# Patient Record
Sex: FEMALE | Race: WHITE | NOT HISPANIC OR LATINO | Employment: OTHER | URBAN - METROPOLITAN AREA
[De-identification: names, ages, dates, MRNs, and addresses within clinical notes are randomized per-mention and may not be internally consistent; named-entity substitution may affect disease eponyms.]

---

## 2024-10-03 ENCOUNTER — OFFICE VISIT (OUTPATIENT)
Dept: PRIMARY CARE CLINIC | Facility: CLINIC | Age: 69
End: 2024-10-03
Payer: MEDICARE

## 2024-10-03 VITALS
OXYGEN SATURATION: 96 % | DIASTOLIC BLOOD PRESSURE: 84 MMHG | HEART RATE: 82 BPM | SYSTOLIC BLOOD PRESSURE: 136 MMHG | TEMPERATURE: 98 F

## 2024-10-03 DIAGNOSIS — M79.89 LEG SWELLING: ICD-10-CM

## 2024-10-03 DIAGNOSIS — Z11.59 ENCOUNTER FOR HEPATITIS C SCREENING TEST FOR LOW RISK PATIENT: ICD-10-CM

## 2024-10-03 DIAGNOSIS — Z00.00 ENCOUNTER FOR MEDICAL EXAMINATION TO ESTABLISH CARE: Primary | ICD-10-CM

## 2024-10-03 DIAGNOSIS — F41.9 ANXIETY: ICD-10-CM

## 2024-10-03 DIAGNOSIS — F33.1 MAJOR DEPRESSIVE DISORDER, RECURRENT, MODERATE: ICD-10-CM

## 2024-10-03 DIAGNOSIS — R71.8 LOW MEAN CORPUSCULAR VOLUME (MCV): ICD-10-CM

## 2024-10-03 DIAGNOSIS — E03.9 ACQUIRED HYPOTHYROIDISM: ICD-10-CM

## 2024-10-03 DIAGNOSIS — Z23 NEED FOR INFLUENZA VACCINATION: ICD-10-CM

## 2024-10-03 DIAGNOSIS — I10 HYPERTENSION, UNSPECIFIED TYPE: ICD-10-CM

## 2024-10-03 DIAGNOSIS — E78.5 HYPERLIPIDEMIA, UNSPECIFIED HYPERLIPIDEMIA TYPE: ICD-10-CM

## 2024-10-03 DIAGNOSIS — K21.9 GASTROESOPHAGEAL REFLUX DISEASE, UNSPECIFIED WHETHER ESOPHAGITIS PRESENT: Chronic | ICD-10-CM

## 2024-10-03 DIAGNOSIS — G47.00 INSOMNIA, UNSPECIFIED TYPE: ICD-10-CM

## 2024-10-03 DIAGNOSIS — Z78.0 ASYMPTOMATIC AGE-RELATED POSTMENOPAUSAL STATE: ICD-10-CM

## 2024-10-03 DIAGNOSIS — Z79.899 ENCOUNTER FOR LONG-TERM CURRENT USE OF MEDICATION: ICD-10-CM

## 2024-10-03 DIAGNOSIS — G25.81 RLS (RESTLESS LEGS SYNDROME): ICD-10-CM

## 2024-10-03 DIAGNOSIS — E07.9 THYROID DISEASE: ICD-10-CM

## 2024-10-03 DIAGNOSIS — E11.9 TYPE 2 DIABETES MELLITUS WITHOUT COMPLICATION, WITHOUT LONG-TERM CURRENT USE OF INSULIN: ICD-10-CM

## 2024-10-03 PROCEDURE — 1159F MED LIST DOCD IN RCRD: CPT | Mod: CPTII,S$GLB,, | Performed by: NURSE PRACTITIONER

## 2024-10-03 PROCEDURE — 1101F PT FALLS ASSESS-DOCD LE1/YR: CPT | Mod: CPTII,S$GLB,, | Performed by: NURSE PRACTITIONER

## 2024-10-03 PROCEDURE — 3075F SYST BP GE 130 - 139MM HG: CPT | Mod: CPTII,S$GLB,, | Performed by: NURSE PRACTITIONER

## 2024-10-03 PROCEDURE — G2211 COMPLEX E/M VISIT ADD ON: HCPCS | Mod: S$GLB,,, | Performed by: NURSE PRACTITIONER

## 2024-10-03 PROCEDURE — 3079F DIAST BP 80-89 MM HG: CPT | Mod: CPTII,S$GLB,, | Performed by: NURSE PRACTITIONER

## 2024-10-03 PROCEDURE — 90653 IIV ADJUVANT VACCINE IM: CPT | Mod: S$GLB,,, | Performed by: NURSE PRACTITIONER

## 2024-10-03 PROCEDURE — 99999 PR PBB SHADOW E&M-NEW PATIENT-LVL V: CPT | Mod: PBBFAC,,, | Performed by: NURSE PRACTITIONER

## 2024-10-03 PROCEDURE — 3288F FALL RISK ASSESSMENT DOCD: CPT | Mod: CPTII,S$GLB,, | Performed by: NURSE PRACTITIONER

## 2024-10-03 PROCEDURE — 1126F AMNT PAIN NOTED NONE PRSNT: CPT | Mod: CPTII,S$GLB,, | Performed by: NURSE PRACTITIONER

## 2024-10-03 PROCEDURE — 99204 OFFICE O/P NEW MOD 45 MIN: CPT | Mod: S$GLB,,, | Performed by: NURSE PRACTITIONER

## 2024-10-03 PROCEDURE — G0008 ADMIN INFLUENZA VIRUS VAC: HCPCS | Mod: S$GLB,,, | Performed by: NURSE PRACTITIONER

## 2024-10-03 RX ORDER — NYSTATIN AND TRIAMCINOLONE ACETONIDE 100000; 1 [USP'U]/G; MG/G
CREAM TOPICAL 2 TIMES DAILY
COMMUNITY
Start: 2024-05-02

## 2024-10-03 RX ORDER — ESTRADIOL 1 MG/1
1 TABLET ORAL DAILY
COMMUNITY
Start: 2024-08-23

## 2024-10-03 RX ORDER — SPIRONOLACTONE 25 MG/1
1 TABLET ORAL DAILY
COMMUNITY
Start: 2024-05-14

## 2024-10-03 RX ORDER — PROMETHAZINE HYDROCHLORIDE 25 MG/1
1 TABLET ORAL EVERY 12 HOURS PRN
COMMUNITY

## 2024-10-03 RX ORDER — FUROSEMIDE 40 MG/1
40 TABLET ORAL DAILY
Qty: 3 TABLET | Refills: 0 | Status: SHIPPED | OUTPATIENT
Start: 2024-10-03 | End: 2025-10-03

## 2024-10-03 RX ORDER — CYCLOSPORINE 0.5 MG/ML
1 EMULSION OPHTHALMIC 2 TIMES DAILY
COMMUNITY
Start: 2024-05-06

## 2024-10-03 RX ORDER — LEVOTHYROXINE SODIUM 175 UG/1
175 TABLET ORAL DAILY
COMMUNITY
Start: 2023-12-05

## 2024-10-03 RX ORDER — FLUVOXAMINE MALEATE 100 MG/1
100 TABLET, COATED ORAL NIGHTLY
COMMUNITY
Start: 2024-08-12

## 2024-10-03 RX ORDER — ROPINIROLE 2 MG/1
1 TABLET, FILM COATED ORAL DAILY
COMMUNITY
Start: 2024-05-14

## 2024-10-03 RX ORDER — GABAPENTIN 600 MG/1
1 TABLET ORAL 3 TIMES DAILY
COMMUNITY

## 2024-10-03 RX ORDER — METOPROLOL TARTRATE 25 MG/1
1 TABLET, FILM COATED ORAL 2 TIMES DAILY WITH MEALS
COMMUNITY
Start: 2024-05-14

## 2024-10-03 RX ORDER — ALBUTEROL SULFATE 90 UG/1
2 INHALANT RESPIRATORY (INHALATION) EVERY 4 HOURS PRN
COMMUNITY

## 2024-10-03 RX ORDER — ATORVASTATIN CALCIUM 40 MG/1
1 TABLET, FILM COATED ORAL DAILY
COMMUNITY
Start: 2024-05-14

## 2024-10-03 RX ORDER — METFORMIN HYDROCHLORIDE 500 MG/1
500 TABLET, EXTENDED RELEASE ORAL DAILY
COMMUNITY
Start: 2024-05-14 | End: 2024-11-10

## 2024-10-03 RX ORDER — BREXPIPRAZOLE 2 MG/1
4 TABLET ORAL DAILY
COMMUNITY
Start: 2024-01-18

## 2024-10-03 RX ORDER — TRAZODONE HYDROCHLORIDE 150 MG/1
150 TABLET ORAL NIGHTLY
COMMUNITY
Start: 2024-08-12

## 2024-10-03 RX ORDER — FLUTICASONE PROPIONATE 50 MCG
1 SPRAY, SUSPENSION (ML) NASAL
COMMUNITY

## 2024-10-03 RX ORDER — OMEPRAZOLE 20 MG/1
20 CAPSULE, DELAYED RELEASE ORAL DAILY
COMMUNITY

## 2024-10-03 RX ORDER — CLONAZEPAM 0.5 MG/1
1 TABLET ORAL DAILY
COMMUNITY
End: 2024-10-11 | Stop reason: SDUPTHER

## 2024-10-03 RX ORDER — PREDNISOLONE ACETATE 10 MG/ML
2 SUSPENSION/ DROPS OPHTHALMIC 2 TIMES DAILY
COMMUNITY
Start: 2024-09-27 | End: 2024-10-11

## 2024-10-07 PROBLEM — K21.9 GERD (GASTROESOPHAGEAL REFLUX DISEASE): Chronic | Status: ACTIVE | Noted: 2024-10-07

## 2024-10-07 NOTE — PROGRESS NOTES
Assessment/Plan:    Problem List Items Addressed This Visit          Neuro    RLS (restless legs syndrome)    Overview     She reports restless leg syndrome, effectively managed with Requip.             Psychiatric    Anxiety    Overview     Chronic.   She reports her depression and anxiety are controlled with medication, including Klonopin, Trazodone, and Fluvoxamine (Luvox).         Major depressive disorder, recurrent, moderate    Overview     Chronic.   She reports her depression and anxiety are controlled with medication, including Klonopin, Trazodone, and Fluvoxamine (Luvox).             Cardiac/Vascular    Hyperlipidemia    Overview     Chronic.   On statin.  Check lipid panel.          Relevant Orders    Lipid Panel    Hypertension    Overview     She is being treated for hypertension with metoprolol and spironolactone. An echocardiogram showed no regional wall abnormalities and good EF. She denies any history of heart failure or other cardiac issues.              Endocrine    Diabetes mellitus, type 2    Overview     Diabetes Medications               metFORMIN (GLUCOPHAGE-XR) 500 MG ER 24hr tablet Take 500 mg by mouth Daily.          -condition is currently controlled   -plan to check A1C   -see diabetic health maintenance listed below  -on statin: Yes  -on ACE-I/ARB: No  -counseling provided on importance of diabetic diet and medication compliance in order to treat diabetes  -discussed diabetes disease course and potential complications          Relevant Medications    metFORMIN (GLUCOPHAGE-XR) 500 MG ER 24hr tablet    Other Relevant Orders    Hemoglobin A1C    HM DIABETES FOOT EXAM (Completed)    Thyroid disease    Overview     She has acquired hypothyroidism, treated with levothyroxine 175 mcg. A recent thyroid ultrasound was consistent with chronic thyroiditis. She denies any recent changes in thyroid-related symptoms.            GI    GERD (gastroesophageal reflux disease) (Chronic)    Overview       Chronic.  Stable.    Continue Prilosec.            Other    Insomnia    Overview     She reports insomnia, treated with trazodone 150 mg. Muscle spasms in her arms and legs are the primary cause of her sleep disturbance.          Other Visit Diagnoses       Encounter for medical examination to establish care    -  Primary    Relevant Orders    CBC Auto Differential    Comprehensive Metabolic Panel    Acquired hypothyroidism        Relevant Orders    TSH    Asymptomatic age-related postmenopausal state        Relevant Orders    DXA Bone Density Axial Skeleton 1 or more sites    Encounter for hepatitis C screening test for low risk patient        Relevant Orders    Hepatitis C antibody    Encounter for long-term current use of medication        Relevant Orders    CBC Auto Differential    TSH    IRON AND TIBC    FERRITIN    Need for influenza vaccination        Relevant Medications    influenza (adjuvanted) (Fluad) 45 mcg/0.5 mL IM vaccine (> or = 66 yo) 0.5 mL (Completed)    Low mean corpuscular volume (MCV)        Relevant Orders    IRON AND TIBC    FERRITIN    Leg swelling        Relevant Medications    furosemide (LASIX) 40 MG tablet            Follow up in about 3 months (around 1/3/2025).    Susanne Reyna, SOCO  ______________________________________________________________________________________________________________________________    History of Present Illness    CHIEF COMPLAINT:  Ms. Herman presents today for follow up.    NEUROLOGICAL:  She also experiences muscle spasms in her arms and legs, which significantly impact her sleep. Previous treatments with Flexeril and methocarbamol were ineffective. She attempts to alleviate discomfort by stretching her arm at night. Muscle spasms in her arms and legs are the primary cause of her sleep disturbance.    MUSCULOSKELETAL:  She reports a history of chronic back pain following back surgery. She is currently treated with gabapentin, which significantly helps  with pain management and nerve-related symptoms. Her back pain has largely subsided since the surgery.    EDEMA:  She experiences daily leg swelling and puffiness, present upon waking.  She has taken her friends hydrochlorothiazide and it was effective in reducing swelling in the past. She has attempted to reduce sodium intake, recognizing it as a contributing factor.    PAST MEDICAL HISTORY:  In  or , she had a non-cancerous breast lump in her right breast, confirmed by biopsy. She denies any current breast concerns.    SOCIAL HISTORY:  She lives in [place].    Previous PCP:  Sierra Hart MD     No other new complaints today.  Remaining chronic conditions have been reviewed and remain stable. Further detail as stated above.      HM reviewed.    Past Medical History:  Past Medical History:   Diagnosis Date    Anxiety     Depression     Diabetes mellitus, type 2     Hyperlipidemia     Hypertension     Insomnia     RLS (restless legs syndrome)     Thyroid disease      Past Surgical History:   Procedure Laterality Date    BACK SURGERY      BREAST BIOPSY       SECTION      FOOT SURGERY      HYSTERECTOMY       Review of patient's allergies indicates:  No Known Allergies  Social History     Tobacco Use    Smoking status: Never    Smokeless tobacco: Never   Substance Use Topics    Alcohol use: Not Currently    Drug use: Never     Family History   Problem Relation Name Age of Onset    Alzheimer's disease Mother      Cancer Father      Cancer Sister      Diabetes Sister       Current Outpatient Medications on File Prior to Visit   Medication Sig Dispense Refill    albuterol (PROVENTIL/VENTOLIN HFA) 90 mcg/actuation inhaler Inhale 2 puffs into the lungs every 4 (four) hours as needed for Wheezing.      atorvastatin (LIPITOR) 40 MG tablet Take 1 tablet by mouth once daily.      clonazePAM (KLONOPIN) 0.5 MG tablet Take 1 tablet by mouth once daily.      estradioL (ESTRACE) 1 MG tablet Take 1 tablet by mouth  once daily.      fluticasone propionate (FLONASE) 50 mcg/actuation nasal spray 1 spray by Each Nostril route.      fluvoxaMINE (LUVOX) 100 MG tablet Take 100 mg by mouth every evening.      gabapentin (NEURONTIN) 600 MG tablet Take 1 tablet by mouth 3 (three) times daily.      levothyroxine (SYNTHROID, LEVOTHROID) 175 MCG tablet Take 175 mcg by mouth once daily.      metFORMIN (GLUCOPHAGE-XR) 500 MG ER 24hr tablet Take 500 mg by mouth Daily.      metoprolol tartrate (LOPRESSOR) 25 MG tablet Take 1 tablet by mouth 2 (two) times daily with meals.      nystatin-triamcinolone (MYCOLOG II) cream Apply topically 2 (two) times daily.      omeprazole (PRILOSEC) 20 MG capsule Take 20 mg by mouth once daily.      prednisoLONE acetate (PRED FORTE) 1 % DrpS Place 2 drops into both eyes 2 (two) times daily.      promethazine (PHENERGAN) 25 MG tablet Take 1 tablet by mouth every 12 (twelve) hours as needed.      RESTASIS 0.05 % ophthalmic emulsion Place 1 drop into both eyes 2 (two) times daily.      REXULTI 2 mg Tab Take 4 mg by mouth Daily.      rOPINIRole (REQUIP) 2 MG tablet Take 1 tablet by mouth once daily.      spironolactone (ALDACTONE) 25 MG tablet Take 1 tablet by mouth once daily.      traZODone (DESYREL) 150 MG tablet Take 150 mg by mouth every evening.       No current facility-administered medications on file prior to visit.       Review of Systems   Constitutional: Negative.    HENT: Negative.     Eyes: Negative.    Respiratory: Negative.     Cardiovascular: Negative.    Gastrointestinal: Negative.    Endocrine: Negative.    Genitourinary: Negative.    Musculoskeletal:  Positive for arthralgias, back pain and myalgias.   Skin: Negative.    Allergic/Immunologic: Negative.    Neurological: Negative.    Hematological: Negative.    Psychiatric/Behavioral: Negative.         Vitals:    10/03/24 0936   BP: 136/84   BP Location: Left arm   Patient Position: Sitting   Pulse: 82   Temp: 98.4 °F (36.9 °C)   SpO2: 96%       Wt  Readings from Last 3 Encounters:   10/12/10 59.5 kg (131 lb 4.5 oz)       Physical Exam  Vitals and nursing note reviewed.   Constitutional:       Appearance: She is well-developed.   HENT:      Head: Normocephalic and atraumatic.      Right Ear: Tympanic membrane normal.      Left Ear: Tympanic membrane normal.      Nose: Nose normal.      Mouth/Throat:      Mouth: Mucous membranes are moist.   Eyes:      Conjunctiva/sclera: Conjunctivae normal.      Pupils: Pupils are equal, round, and reactive to light.   Cardiovascular:      Rate and Rhythm: Normal rate and regular rhythm.      Heart sounds: Normal heart sounds.   Pulmonary:      Effort: Pulmonary effort is normal. No respiratory distress.      Breath sounds: Normal breath sounds.   Abdominal:      General: Bowel sounds are normal. There is no distension.      Palpations: Abdomen is soft.   Musculoskeletal:         General: Normal range of motion.      Cervical back: Normal range of motion and neck supple.   Skin:     General: Skin is warm and dry.      Capillary Refill: Capillary refill takes less than 2 seconds.   Neurological:      General: No focal deficit present.      Mental Status: She is alert and oriented to person, place, and time.      Deep Tendon Reflexes: Reflexes are normal and symmetric.   Psychiatric:         Behavior: Behavior normal.         Thought Content: Thought content normal.         Judgment: Judgment normal.         Health Maintenance   Topic Date Due    Hepatitis C Screening  Never done    DEXA Scan  Never done    Shingles Vaccine (2 of 2) 07/10/2024    Mammogram  02/26/2025    TETANUS VACCINE  04/04/2028    Lipid Panel  05/07/2029    Colorectal Cancer Screening  07/07/2033       This note was generated with the assistance of ambient listening technology. Verbal consent was obtained by the patient and accompanying visitor(s) for the recording of patient appointment to facilitate this note. I attest to having reviewed and edited the  generated note for accuracy, though some syntax or spelling errors may persist. Please contact the author of this note for any clarification.

## 2024-10-08 ENCOUNTER — HOSPITAL ENCOUNTER (OUTPATIENT)
Dept: RADIOLOGY | Facility: HOSPITAL | Age: 69
Discharge: HOME | End: 2024-10-08
Attending: NURSE PRACTITIONER
Payer: MEDICARE

## 2024-10-08 ENCOUNTER — TELEPHONE (OUTPATIENT)
Dept: PRIMARY CARE CLINIC | Facility: CLINIC | Age: 69
End: 2024-10-08
Payer: MEDICARE

## 2024-10-08 DIAGNOSIS — Z78.0 ASYMPTOMATIC AGE-RELATED POSTMENOPAUSAL STATE: ICD-10-CM

## 2024-10-08 PROCEDURE — 77080 DXA BONE DENSITY AXIAL: CPT | Mod: 26,,, | Performed by: STUDENT IN AN ORGANIZED HEALTH CARE EDUCATION/TRAINING PROGRAM

## 2024-10-08 PROCEDURE — 77080 DXA BONE DENSITY AXIAL: CPT | Mod: TC,PO

## 2024-10-08 NOTE — PROGRESS NOTES
Results have been released via my chart. Please verify that these have been reviewed by the pt. If not, please call pt with results.       DEXA NORMAL-The DEXA scan for osteoporosis screening was normal.  Repeat the test every 5 years.

## 2024-10-08 NOTE — TELEPHONE ENCOUNTER
----- Message from Laureen sent at 10/8/2024  3:28 PM CDT -----  Contact: Pricilla  .Type:  Test Results    Who Called: Pricilla  Name of Test (Lab/Mammo/Etc): lab  Date of Test: 10/08  Ordering Provider: ELVIN ARRIETA STAFF  Where the test was performed: [facility]  Would the patient rather a call back or a response via [facility]? Call back  Best Call Back Number: [phone]   Additional Information:  na      Thanks  NANCY

## 2024-10-08 NOTE — TELEPHONE ENCOUNTER
----- Message from Susanne Reyna NP sent at 10/8/2024  2:54 PM CDT -----  Results have been released via my chart. Please verify that these have been reviewed by the pt. If not, please call pt with results.       DEXA NORMAL-The DEXA scan for osteoporosis screening was normal.  Repeat the test every 5 years.

## 2024-10-11 ENCOUNTER — TELEPHONE (OUTPATIENT)
Dept: FAMILY MEDICINE | Facility: CLINIC | Age: 69
End: 2024-10-11
Payer: MEDICARE

## 2024-10-11 ENCOUNTER — OFFICE VISIT (OUTPATIENT)
Dept: PRIMARY CARE CLINIC | Facility: CLINIC | Age: 69
End: 2024-10-11
Payer: MEDICARE

## 2024-10-11 DIAGNOSIS — G47.00 INSOMNIA, UNSPECIFIED TYPE: Primary | ICD-10-CM

## 2024-10-11 DIAGNOSIS — F41.9 ANXIETY: ICD-10-CM

## 2024-10-11 DIAGNOSIS — R74.8 ELEVATED LIVER ENZYMES: ICD-10-CM

## 2024-10-11 DIAGNOSIS — E61.1 IRON DEFICIENCY: ICD-10-CM

## 2024-10-11 DIAGNOSIS — B19.20 HEPATITIS C TEST POSITIVE: ICD-10-CM

## 2024-10-11 DIAGNOSIS — E11.9 TYPE 2 DIABETES MELLITUS WITHOUT COMPLICATION, WITHOUT LONG-TERM CURRENT USE OF INSULIN: ICD-10-CM

## 2024-10-11 RX ORDER — CLONAZEPAM 0.5 MG/1
0.5 TABLET ORAL DAILY
Qty: 30 TABLET | Refills: 0 | Status: SHIPPED | OUTPATIENT
Start: 2024-10-11

## 2024-10-11 RX ORDER — ZOLPIDEM TARTRATE 5 MG/1
5 TABLET ORAL NIGHTLY PRN
Qty: 30 TABLET | Refills: 0 | Status: SHIPPED | OUTPATIENT
Start: 2024-10-11 | End: 2025-04-11

## 2024-10-11 RX ORDER — FERROUS SULFATE 325(65) MG
325 TABLET, DELAYED RELEASE (ENTERIC COATED) ORAL DAILY
Qty: 30 TABLET | Refills: 5 | Status: SHIPPED | OUTPATIENT
Start: 2024-10-11

## 2024-10-11 NOTE — PROGRESS NOTES
Primary Care Telemedicine Note  The patient location is:  Patient Home   The chief complaint leading to consultation is:  discuss lab results  Total time spent with patient: 20 minutes     Visit type: Virtual visit with synchronous audio and video  Each patient to whom he or she provides medical services by telemedicine is:  (1) informed of the relationship between the physician and patient and the respective role of any other health care provider with respect to management of the patient; and (2) notified that he or she may decline to receive medical services by telemedicine and may withdraw from such care at any time.      Assessment/Plan:    Problem List Items Addressed This Visit          Psychiatric    Anxiety    Overview     Chronic.   She reports her depression and anxiety are controlled with medication, including Klonopin, Trazodone, and Fluvoxamine (Luvox).         Relevant Medications    clonazePAM (KLONOPIN) 0.5 MG tablet       Endocrine    Diabetes mellitus, type 2    Overview     Diabetes Medications               metFORMIN (GLUCOPHAGE-XR) 500 MG ER 24hr tablet Take 500 mg by mouth Daily.        -  Lab Results   Component Value Date    HGBA1C 6.7 (H) 10/08/2024      -see diabetic health maintenance listed below  -on statin: Yes  -on ACE-I/ARB: No  -counseling provided on importance of diabetic diet and medication compliance in order to treat diabetes  -discussed diabetes disease course and potential complications             Other    Insomnia - Primary    Overview     She reports insomnia, treated with trazodone 150 mg. Muscle spasms in her arms and legs are the primary cause of her sleep disturbance.         Relevant Medications    zolpidem (AMBIEN) 5 MG Tab     Other Visit Diagnoses       Elevated liver enzymes        Relevant Orders    US Abdomen Limited_Liver    COMPREHENSIVE METABOLIC PANEL    Hepatitis C test positive        Relevant Orders    HEPATITIS C RNA, QUANTITATIVE, PCR    Iron deficiency         Relevant Medications    ferrous sulfate 325 (65 FE) MG EC tablet    Other Relevant Orders    IRON AND TIBC            Follow up in about 3 months (around 1/11/2025).    Susanne Reyna, NP    _____________________________________________________________________________________________________________________________________________________    History of Present Illness    CHIEF COMPLAINT:  Ms. Herman presents today for follow up and to discuss recent labs.     DIABETES:  Her hemoglobin A1C is in the diabetic range at 6.7. She is currently taking metformin for diabetes management.    HEPATITIS C AND LIVER FUNCTION:  She reports past exposure to hepatitis C in 2007 or 2008 through contact with an infected individual. Recent lab results indicate a reactive hepatitis C antibody test. Her liver enzymes are currently elevated, though not as significantly as in previous years.    THYROID ISSUES:  She has a history of thyroid issues with previously elevated thyroid counts, which have significantly improved. Her most recent thyroid count was 5.357, down from a previous high of approximately 40,000. She is currently taking levothyroxine 175 mg. She recently underwent a thyroid sonogram, which she reports showed normal results. She is scheduled for follow-up with her endocrinologist next year.    SLEEP ISSUES AND ANXIETY:  She reports ongoing sleep difficulties despite taking trazodone. While the medication is helping, it is not fully effective in improving her sleep. She is currently using three melatonin gummies nightly, which she finds somewhat helpful but insufficient. She expresses a desire for additional sleep support beyond her current regimen. She requests a refill of Klonopin to last until her next appointment with Kelli on November 10th.    LABORATORY RESULTS:  She reports being informed of a mildly low potassium level, with the value being 0.1 below the normal range. She denies any symptoms related to  hypokalemia. Her saturated iron level was found to be low. She is not currently taking iron supplements.          Past Medical History:  Past Medical History:   Diagnosis Date    Anxiety     Depression     Diabetes mellitus, type 2     Hyperlipidemia     Hypertension     Insomnia     RLS (restless legs syndrome)     Thyroid disease      Past Surgical History:   Procedure Laterality Date    BACK SURGERY      BREAST BIOPSY       SECTION      CHOLECYSTECTOMY      [facility]    EYE SURGERY      [facility]    FOOT SURGERY      FRACTURE SURGERY      Foot    HYSTERECTOMY      TONSILLECTOMY  1976     Review of patient's allergies indicates:  No Known Allergies  Social History     Tobacco Use    Smoking status: Never    Smokeless tobacco: Never   Substance Use Topics    Alcohol use: Not Currently     Alcohol/week: 1.0 standard drink of alcohol     Types: 1 Glasses of wine per week    Drug use: Never     Family History   Problem Relation Name Age of Onset    Alzheimer's disease Mother Marta Quintana     Diabetes Mother Marta Quintana     Stroke Mother Marta Quintana     Cancer Father Jem Quintana     Arthritis Father Jem Quintana     Depression Father Jem Quintana     Diabetes Father Jem Quintana     Cancer Sister      Diabetes Sister      Miscarriages / Stillbirths Daughter Connie Hernández ([facility])      Current Outpatient Medications on File Prior to Visit   Medication Sig Dispense Refill    albuterol (PROVENTIL/VENTOLIN HFA) 90 mcg/actuation inhaler Inhale 2 puffs into the lungs every 4 (four) hours as needed for Wheezing.      atorvastatin (LIPITOR) 40 MG tablet Take 1 tablet by mouth once daily.      estradioL (ESTRACE) 1 MG tablet Take 1 tablet by mouth once daily.      fluticasone propionate (FLONASE) 50 mcg/actuation nasal spray 1 spray by Each Nostril route.      fluvoxaMINE (LUVOX) 100 MG tablet Take 100 mg by mouth every evening.      furosemide  (LASIX) 40 MG tablet Take 1 tablet (40 mg total) by mouth once daily. 3 tablet 0    gabapentin (NEURONTIN) 600 MG tablet Take 1 tablet by mouth 3 (three) times daily.      levothyroxine (SYNTHROID, LEVOTHROID) 175 MCG tablet Take 175 mcg by mouth once daily.      metFORMIN (GLUCOPHAGE-XR) 500 MG ER 24hr tablet Take 500 mg by mouth Daily.      metoprolol tartrate (LOPRESSOR) 25 MG tablet Take 1 tablet by mouth 2 (two) times daily with meals.      nystatin-triamcinolone (MYCOLOG II) cream Apply topically 2 (two) times daily.      omeprazole (PRILOSEC) 20 MG capsule Take 20 mg by mouth once daily.      promethazine (PHENERGAN) 25 MG tablet Take 1 tablet by mouth every 12 (twelve) hours as needed.      RESTASIS 0.05 % ophthalmic emulsion Place 1 drop into both eyes 2 (two) times daily.      REXULTI 2 mg Tab Take 4 mg by mouth Daily.      rOPINIRole (REQUIP) 2 MG tablet Take 1 tablet by mouth once daily.      spironolactone (ALDACTONE) 25 MG tablet Take 1 tablet by mouth once daily.      traZODone (DESYREL) 150 MG tablet Take 150 mg by mouth every evening.      [DISCONTINUED] clonazePAM (KLONOPIN) 0.5 MG tablet Take 1 tablet by mouth once daily.      [DISCONTINUED] prednisoLONE acetate (PRED FORTE) 1 % DrpS Place 2 drops into both eyes 2 (two) times daily.       No current facility-administered medications on file prior to visit.       Review of Systems   HENT:  Negative for hearing loss.    Eyes:  Negative for discharge.   Respiratory:  Negative for wheezing.    Cardiovascular:  Negative for chest pain and palpitations.   Gastrointestinal:  Negative for blood in stool, constipation, diarrhea and vomiting.   Genitourinary:  Negative for dysuria and hematuria.   Musculoskeletal:  Negative for neck pain.   Neurological:  Negative for weakness and headaches.   Endo/Heme/Allergies:  Negative for polydipsia.         Physical Exam   @thptenteredbppulse@  @thptenteredglucose@    Physical Exam  Vitals and nursing note reviewed.    Constitutional:       Appearance: She is well-developed.   HENT:      Head: Normocephalic and atraumatic.   Pulmonary:      Effort: Pulmonary effort is normal.   Musculoskeletal:      Cervical back: Normal range of motion.   Neurological:      Mental Status: She is alert and oriented to person, place, and time.   Psychiatric:         Behavior: Behavior normal.         Thought Content: Thought content normal.         Judgment: Judgment normal.         This note was generated with the assistance of ambient listening technology. Verbal consent was obtained by the patient and accompanying visitor(s) for the recording of patient appointment to facilitate this note. I attest to having reviewed and edited the generated note for accuracy, though some syntax or spelling errors may persist. Please contact the author of this note for any clarification.       The patient's Health Maintenance was reviewed and the following appears to be due at this time:  Health Maintenance Due   Topic Date Due    Pneumococcal Vaccines (Age 65+) (2 of 2 - PCV) 04/13/2023    Shingles Vaccine (2 of 2) 07/10/2024    COVID-19 Vaccine (2 - 2024-25 season) 09/01/2024

## 2024-10-11 NOTE — TELEPHONE ENCOUNTER
----- Message from Susanne Reyna NP sent at 10/11/2024  7:54 AM CDT -----   CMP and iron studies in 6 weeks   Please schedule ultrasound liver and PCR  for to date or next week.

## 2024-10-11 NOTE — Clinical Note
CMP and iron studies in 6 weeks  Please schedule ultrasound liver and PCR  for to date or next week.

## 2024-10-14 ENCOUNTER — HOSPITAL ENCOUNTER (OUTPATIENT)
Dept: RADIOLOGY | Facility: HOSPITAL | Age: 69
Discharge: HOME | End: 2024-10-14
Attending: NURSE PRACTITIONER
Payer: MEDICARE

## 2024-10-14 DIAGNOSIS — R74.8 ELEVATED LIVER ENZYMES: ICD-10-CM

## 2024-10-14 PROCEDURE — 76705 ECHO EXAM OF ABDOMEN: CPT | Mod: 26,,, | Performed by: RADIOLOGY

## 2024-10-14 PROCEDURE — 76705 ECHO EXAM OF ABDOMEN: CPT | Mod: TC,PO

## 2024-10-15 ENCOUNTER — PATIENT MESSAGE (OUTPATIENT)
Dept: HEPATOLOGY | Facility: CLINIC | Age: 69
End: 2024-10-15
Payer: MEDICARE

## 2024-10-15 DIAGNOSIS — K76.0 FATTY LIVER: Primary | ICD-10-CM

## 2024-10-15 NOTE — PROGRESS NOTES
Results have been released via my chart. Please verify that these have been reviewed by the pt. If not, please call pt with results.     U/S LIVER ABNORMAL-FATTY LIVER: I have placed a consult for hepatology.

## 2024-10-16 DIAGNOSIS — E11.9 TYPE 2 DIABETES MELLITUS WITHOUT COMPLICATION: ICD-10-CM

## 2024-10-17 ENCOUNTER — TELEPHONE (OUTPATIENT)
Dept: FAMILY MEDICINE | Facility: CLINIC | Age: 69
End: 2024-10-17
Payer: MEDICARE

## 2024-10-17 NOTE — TELEPHONE ENCOUNTER
----- Message from Galo sent at 10/17/2024 10:30 AM CDT -----  Contact: Pricilla  Type:  Patient Returning Call    Who Called:Pricilla  Who Left Message for Patient:Lucia  Does the patient know what this is regarding?: return call  Would the patient rather a call back or a response via [facility]?  Call back  Best Call Back Number:[phone]  Additional Information:         Thanks   Am

## 2024-10-21 RX ORDER — ESTRADIOL 1 MG/1
1 TABLET ORAL DAILY
Qty: 90 TABLET | Refills: 3 | Status: SHIPPED | OUTPATIENT
Start: 2024-10-21

## 2024-10-21 NOTE — TELEPHONE ENCOUNTER
----- Message from Amita sent at 10/21/2024  7:57 AM CDT -----  Contact: pt  Type:  RX Refill Request    Who Called:  pt  Refill or New Rx: refill  RX Name and Strength: estradioL (ESTRACE) 1 MG tablet  How is the patient currently taking it? (ex. 1XDay):  Is this a 30 day or 90 day RX:  Preferred Pharmacy with phone number: [phone]  Local or Mail Order: local  Ordering Provider: taisha  Would the patient rather a call back or a response via [facility]?   Best Call Back Number:  Additional Information:       [id number] - [place], [place] - [facility] [address] [facility] [address] [address] [address] [facility] [address]  Phone: [phone] Fax: [phone]

## 2024-10-25 ENCOUNTER — TELEPHONE (OUTPATIENT)
Dept: FAMILY MEDICINE | Facility: CLINIC | Age: 69
End: 2024-10-25
Payer: MEDICARE

## 2024-10-25 ENCOUNTER — OFFICE VISIT (OUTPATIENT)
Dept: PRIMARY CARE CLINIC | Facility: CLINIC | Age: 69
End: 2024-10-25
Payer: MEDICARE

## 2024-10-25 VITALS
WEIGHT: 180.56 LBS | HEART RATE: 81 BPM | HEIGHT: 65 IN | DIASTOLIC BLOOD PRESSURE: 82 MMHG | OXYGEN SATURATION: 95 % | BODY MASS INDEX: 30.08 KG/M2 | SYSTOLIC BLOOD PRESSURE: 128 MMHG | TEMPERATURE: 98 F

## 2024-10-25 DIAGNOSIS — E11.65 TYPE 2 DIABETES MELLITUS WITH HYPERGLYCEMIA, WITHOUT LONG-TERM CURRENT USE OF INSULIN: Primary | ICD-10-CM

## 2024-10-25 DIAGNOSIS — M79.7 FIBROMYALGIA: ICD-10-CM

## 2024-10-25 PROCEDURE — 99999 PR PBB SHADOW E&M-EST. PATIENT-LVL V: CPT | Mod: PBBFAC,,, | Performed by: NURSE PRACTITIONER

## 2024-10-25 RX ORDER — SEMAGLUTIDE 0.68 MG/ML
0.25 INJECTION, SOLUTION SUBCUTANEOUS
Qty: 3 ML | Refills: 0 | Status: SHIPPED | OUTPATIENT
Start: 2024-10-25 | End: 2024-10-25 | Stop reason: ALTCHOICE

## 2024-10-25 RX ORDER — TIRZEPATIDE 2.5 MG/.5ML
2.5 INJECTION, SOLUTION SUBCUTANEOUS
Qty: 4 PEN | Refills: 1 | Status: SHIPPED | OUTPATIENT
Start: 2024-10-25

## 2024-10-25 NOTE — PROGRESS NOTES
Assessment/Plan:    Problem List Items Addressed This Visit       Diabetes mellitus, type 2 - Primary    Overview     Diabetes Medications               metFORMIN (GLUCOPHAGE-XR) 500 MG ER 24hr tablet Take 500 mg by mouth Daily.        -  Lab Results   Component Value Date    HGBA1C 6.7 (H) 10/08/2024      -see diabetic health maintenance listed below  -on statin: Yes  -on ACE-I/ARB: No  -counseling provided on importance of diabetic diet and medication compliance in order to treat diabetes  -discussed diabetes disease course and potential complications     10/25/24  -Plan to  start  Mounjaro.  Discussed risks and benefits of tirzepatide therapy.  Reviewed patient's family history and personal history for thyroid C cell type tumor and MEN syndrome.  Patient denies a history of these disorders or syndromes.  Patient was aware of increased risk of pancreatitis and worsening of these conditions in  animal studies.  Also discussed side effects of nausea vomiting diarrhea and abdominal pain.  Patient should notify me immediately if having any of these symptoms.  ER precautions were given.          Relevant Medications    tirzepatide (MOUNJARO) 2.5 mg/0.5 mL PnIj    Fibromyalgia    Overview     Chronic.   She reports a recent pain flare-up, experiencing severe pain in both sides of her buttocks.  She previously saw Dr. Epps for pain management but is no longer under their care. She has also consulted with neurology for her fibromyalgia in the past, but not recently.  Continue gabapentin   Pain management referral placed.          Relevant Orders    Ambulatory referral/consult to Pain Clinic       Follow up in about 3 months (around 1/25/2025).    Susanne Reyna NP  _____________________________________________________________________________________________________________________________________________________    History of Present Illness    CHIEF COMPLAINT:  Ms. Herman presents today for follow-up of multiple  health concerns.    TYPE 2 DIABETES:  She reports an increase in her A1C level recently. She expresses interest in Ozempic and Mounjaro as a potential treatment option for managing her blood sugar and improving her overall diabetic health.    WEIGHT MANAGEMENT:  She expresses concerns about weight gain, particularly in the abdominal area. Her diet includes carbohydrate-rich foods like pancakes, and she reports inadequate protein intake due to disliking seafood. She confirms drinking diet coke throughout the day but expresses a general dislike for food.     FIBROMYALGIA:  She reports a recent pain flare-up, experiencing severe pain in both sides of her buttocks, which made it difficult for her to get out of a chair. She compares the sensation to muscle soreness after exercising following a long period of inactivity. She previously saw Dr. Epps for pain management but is no longer under their care. She has also consulted with neurology for her fibromyalgia in the past, but not recently.    HYPOTHYROIDISM:  She reports a history of hypothyroidism and recently underwent a thyroid sonogram. She denies any history of thyroid nodules.    No other new complaints today.  Remaining chronic conditions have been reviewed and remain stable. Further detail as stated above.     HM reviewed.     No recent changes to medical/surgical history.    Current Outpatient Medications on File Prior to Visit   Medication Sig Dispense Refill    albuterol (PROVENTIL/VENTOLIN HFA) 90 mcg/actuation inhaler Inhale 2 puffs into the lungs every 4 (four) hours as needed for Wheezing.      atorvastatin (LIPITOR) 40 MG tablet Take 1 tablet by mouth once daily.      clonazePAM (KLONOPIN) 0.5 MG tablet Take 1 tablet (0.5 mg total) by mouth once daily. 30 tablet 0    estradioL (ESTRACE) 1 MG tablet Take 1 tablet (1 mg total) by mouth once daily. 90 tablet 3    ferrous sulfate 325 (65 FE) MG EC tablet Take 1 tablet (325 mg total) by mouth once daily. 30  tablet 5    fluticasone propionate (FLONASE) 50 mcg/actuation nasal spray 1 spray by Each Nostril route.      fluvoxaMINE (LUVOX) 100 MG tablet Take 100 mg by mouth every evening.      gabapentin (NEURONTIN) 600 MG tablet Take 1 tablet by mouth 3 (three) times daily.      levothyroxine (SYNTHROID, LEVOTHROID) 175 MCG tablet Take 175 mcg by mouth once daily.      metFORMIN (GLUCOPHAGE-XR) 500 MG ER 24hr tablet Take 500 mg by mouth Daily.      metoprolol tartrate (LOPRESSOR) 25 MG tablet Take 1 tablet by mouth 2 (two) times daily with meals.      nystatin-triamcinolone (MYCOLOG II) cream Apply topically 2 (two) times daily.      omeprazole (PRILOSEC) 20 MG capsule Take 20 mg by mouth once daily.      promethazine (PHENERGAN) 25 MG tablet Take 1 tablet by mouth every 12 (twelve) hours as needed.      RESTASIS 0.05 % ophthalmic emulsion Place 1 drop into both eyes 2 (two) times daily.      REXULTI 2 mg Tab Take 4 mg by mouth Daily.      rOPINIRole (REQUIP) 2 MG tablet Take 1 tablet by mouth once daily.      spironolactone (ALDACTONE) 25 MG tablet Take 1 tablet by mouth once daily.      traZODone (DESYREL) 150 MG tablet Take 150 mg by mouth every evening.      zolpidem (AMBIEN) 5 MG Tab Take 1 tablet (5 mg total) by mouth nightly as needed (insomnia). 30 tablet 0    furosemide (LASIX) 40 MG tablet Take 1 tablet (40 mg total) by mouth once daily. 3 tablet 0     No current facility-administered medications on file prior to visit.       Review of Systems   Constitutional: Negative.         Weight gain    HENT: Negative.     Eyes: Negative.    Respiratory: Negative.     Cardiovascular: Negative.    Gastrointestinal: Negative.    Endocrine: Negative.    Genitourinary: Negative.    Musculoskeletal:  Positive for arthralgias and myalgias.   Skin: Negative.    Allergic/Immunologic: Negative.    Neurological: Negative.    Hematological: Negative.    Psychiatric/Behavioral: Negative.         Vitals:    10/25/24 0904   BP: 128/82  "  Pulse: 81   Temp: 97.9 °F (36.6 °C)   TempSrc: Temporal   SpO2: 95%   Weight: 81.9 kg (180 lb 8.9 oz)   Height: 5' 5" (1.651 m)       Wt Readings from Last 3 Encounters:   10/25/24 81.9 kg (180 lb 8.9 oz)   10/12/10 59.5 kg (131 lb 4.5 oz)       Physical Exam  Vitals and nursing note reviewed.   Constitutional:       Appearance: She is well-developed. She is obese.   HENT:      Head: Normocephalic and atraumatic.      Right Ear: Tympanic membrane normal.      Left Ear: Tympanic membrane normal.      Nose: Nose normal.      Mouth/Throat:      Mouth: Mucous membranes are moist.   Eyes:      Conjunctiva/sclera: Conjunctivae normal.      Pupils: Pupils are equal, round, and reactive to light.   Cardiovascular:      Rate and Rhythm: Normal rate and regular rhythm.      Heart sounds: Normal heart sounds.   Pulmonary:      Effort: Pulmonary effort is normal. No respiratory distress.      Breath sounds: Normal breath sounds.   Abdominal:      General: Bowel sounds are normal. There is no distension.      Palpations: Abdomen is soft.   Musculoskeletal:         General: Normal range of motion.      Cervical back: Normal range of motion and neck supple.   Skin:     General: Skin is warm and dry.      Capillary Refill: Capillary refill takes less than 2 seconds.   Neurological:      General: No focal deficit present.      Mental Status: She is alert and oriented to person, place, and time.      Deep Tendon Reflexes: Reflexes are normal and symmetric.   Psychiatric:         Behavior: Behavior normal.         Thought Content: Thought content normal.         Judgment: Judgment normal.         Health Maintenance   Topic Date Due    Eye Exam  Never done    Shingles Vaccine (2 of 2) 07/10/2024    Mammogram  02/26/2025    Hemoglobin A1c  04/08/2025    Foot Exam  10/07/2025    Lipid Panel  10/08/2025    DEXA Scan  10/08/2027    TETANUS VACCINE  04/04/2028    Colorectal Cancer Screening  07/07/2033    Hepatitis C Screening  Completed "       This note was generated with the assistance of ambient listening technology. Verbal consent was obtained by the patient and accompanying visitor(s) for the recording of patient appointment to facilitate this note. I attest to having reviewed and edited the generated note for accuracy, though some syntax or spelling errors may persist. Please contact the author of this note for any clarification.

## 2024-10-25 NOTE — TELEPHONE ENCOUNTER
----- Message from [facility] sent at 10/25/2024 11:52 AM CDT -----  Contact: Pricilla Pleitez is needing a call back regarding getting semaglutide (OZEMPIC) 0.25 mg or 0.5 mg (2 mg/3 mL) pen injector change to mounjaro. She is concern about the side effects with semaglutide (OZEMPIC) 0.25 mg or 0.5 mg (2 mg/3 mL) pen injector. Please call back at [phone].

## 2024-10-25 NOTE — TELEPHONE ENCOUNTER
----- Message from Summer sent at 10/25/2024 10:58 AM CDT -----  Contact: Amy/[facility]  Amy is calling regarding the pt semaglutide (OZEMPIC) 0.25 mg or 0.5 mg (2 mg/3 mL) pen injector. She is needing the directions to say 0.25 mg 1xweek for 4 weeks then increase to .5 weekly there after. Please call if any question [phone].

## 2024-10-25 NOTE — TELEPHONE ENCOUNTER
Wrong number listed in message. Had to look up pharmacy phone number to return call.   Spoke to Pharmacist and she states she would need the instructions to state to increase to 0.5 mg after 4 weeks. Per Susanne Reyna NP she is not sending in a refill until she sees pt to evaluate tolerance. Pharmacist states the Rx is dispensed as a 6 week supply and the instructions need to read 0.25 mg 1xweek for 4 weeks then increase to .5 weekly there after.   Susanne Reyna NP then got on the phone to speak to Pharmacist to inform her that she does not want pt to increase until she sees pt. Per SOCO Skinner she informed Pharmacist that it is okay to dispense Rx as needed by Pharmacist, but she will have pt understand she can not increase until she is seen.

## 2024-10-31 ENCOUNTER — TELEPHONE (OUTPATIENT)
Dept: PAIN MEDICINE | Facility: CLINIC | Age: 69
End: 2024-10-31

## 2024-10-31 ENCOUNTER — PATIENT MESSAGE (OUTPATIENT)
Dept: PAIN MEDICINE | Facility: CLINIC | Age: 69
End: 2024-10-31

## 2024-10-31 ENCOUNTER — OFFICE VISIT (OUTPATIENT)
Dept: PAIN MEDICINE | Facility: CLINIC | Age: 69
End: 2024-10-31
Payer: MEDICARE

## 2024-10-31 VITALS
SYSTOLIC BLOOD PRESSURE: 131 MMHG | DIASTOLIC BLOOD PRESSURE: 87 MMHG | WEIGHT: 176.56 LBS | BODY MASS INDEX: 29.42 KG/M2 | HEIGHT: 65 IN | HEART RATE: 82 BPM

## 2024-10-31 DIAGNOSIS — M79.7 FIBROMYALGIA: Primary | ICD-10-CM

## 2024-10-31 DIAGNOSIS — M47.812 CERVICAL SPONDYLOSIS: ICD-10-CM

## 2024-10-31 PROCEDURE — 99999 PR PBB SHADOW E&M-EST. PATIENT-LVL V: CPT | Mod: PBBFAC,,, | Performed by: INTERNAL MEDICINE

## 2024-10-31 RX ORDER — GABAPENTIN 600 MG/1
600 TABLET ORAL 3 TIMES DAILY
Qty: 90 TABLET | Refills: 0 | Status: SHIPPED | OUTPATIENT
Start: 2024-10-31 | End: 2024-11-30

## 2024-11-09 DIAGNOSIS — G47.00 INSOMNIA, UNSPECIFIED TYPE: ICD-10-CM

## 2024-11-11 RX ORDER — ZOLPIDEM TARTRATE 5 MG/1
5 TABLET ORAL NIGHTLY PRN
Qty: 30 TABLET | Refills: 0 | Status: SHIPPED | OUTPATIENT
Start: 2024-11-11 | End: 2025-05-12

## 2024-11-11 NOTE — TELEPHONE ENCOUNTER
Refill Routing Note   Medication(s) are not appropriate for processing by [facility] for the following reason(s):        Non-participating provider    [facility] action(s):  Route             Appointments  past 12m or future 3m with PCP    Date Provider   Last Visit   10/25/2024 Susanne Reyna NP   Next Visit   11/22/2024 Susanne Reyna NP   ED visits in past 90 days: 0        Note composed:8:41 AM 11/11/2024

## 2024-11-19 ENCOUNTER — OFFICE VISIT (OUTPATIENT)
Dept: HEPATOLOGY | Facility: CLINIC | Age: 69
End: 2024-11-19
Payer: MEDICARE

## 2024-11-19 ENCOUNTER — PATIENT OUTREACH (OUTPATIENT)
Dept: ADMINISTRATIVE | Facility: HOSPITAL | Age: 69
End: 2024-11-19
Payer: MEDICARE

## 2024-11-19 DIAGNOSIS — K83.8 DILATION OF BILIARY TRACT: ICD-10-CM

## 2024-11-19 DIAGNOSIS — K76.0 METABOLIC DYSFUNCTION-ASSOCIATED STEATOTIC LIVER DISEASE (MASLD): ICD-10-CM

## 2024-11-19 DIAGNOSIS — E78.5 HYPERLIPIDEMIA, UNSPECIFIED HYPERLIPIDEMIA TYPE: ICD-10-CM

## 2024-11-19 DIAGNOSIS — R76.8 HEPATITIS C ANTIBODY TEST POSITIVE: ICD-10-CM

## 2024-11-19 DIAGNOSIS — E11.9 TYPE 2 DIABETES MELLITUS WITHOUT COMPLICATION, WITHOUT LONG-TERM CURRENT USE OF INSULIN: ICD-10-CM

## 2024-11-19 DIAGNOSIS — R74.8 ELEVATED LIVER ENZYMES: Primary | ICD-10-CM

## 2024-11-19 DIAGNOSIS — E66.811 CLASS 1 OBESITY WITH SERIOUS COMORBIDITY AND BODY MASS INDEX (BMI) OF 30.0 TO 30.9 IN ADULT, UNSPECIFIED OBESITY TYPE: ICD-10-CM

## 2024-11-19 PROCEDURE — 3066F NEPHROPATHY DOC TX: CPT | Mod: HCNC,CPTII,95, | Performed by: NURSE PRACTITIONER

## 2024-11-19 PROCEDURE — 3044F HG A1C LEVEL LT 7.0%: CPT | Mod: HCNC,CPTII,95, | Performed by: NURSE PRACTITIONER

## 2024-11-19 PROCEDURE — 3061F NEG MICROALBUMINURIA REV: CPT | Mod: HCNC,CPTII,95, | Performed by: NURSE PRACTITIONER

## 2024-11-19 PROCEDURE — 99215 OFFICE O/P EST HI 40 MIN: CPT | Mod: HCNC,95,, | Performed by: NURSE PRACTITIONER

## 2024-11-19 RX ORDER — GABAPENTIN 600 MG/1
600 TABLET ORAL 3 TIMES DAILY
Qty: 90 TABLET | Refills: 0 | Status: SHIPPED | OUTPATIENT
Start: 2024-11-19 | End: 2024-12-19

## 2024-11-19 NOTE — PROGRESS NOTES
[facility] - New Patient     REFERRING PROVIDER: Susanne Reyna  PCP: Susanne Reyna NP    Chief Complaint: Elevated liver enzymes, fatty liver      HISTORY     This is a 69 y.o. female referred for evaluation of above.    Hepatic steatosis noted on imaging over the last few years.  Her most recent abd US showed hepatosplenomegaly and dilated CBD (1.4 cm). No liver lesions.     Review of labs:  - Transaminases: elevations dating back to 2010 though minimal labs to review in between. Currently elevated, AST>ALT (174, 67)  - Alk phos: elevated, 184  - Synthetic liver function: WNL  - Platelets: WNL    Workup thus far:  Serologies:   Lab Results   Component Value Date    FERRITIN 138 10/08/2024    FESATURATED 14 (L) 10/08/2024    HEPCAB Reactive (A) 10/08/2024    CPK 60 09/23/2010   HCV RNA negative     Risk factors for fatty liver:   Weight -- BMI 30             Dyslipidemia -- yes  On statin                                Insulin resistance / diabetes -- yes, HgbA1c 6.7  Started Mounjaro ~4 weeks ago         Hypertension -- yes  Alcohol use -- none and no h/o heavy alcohol use   Other -- hypothyroid     Family history:  No family history of liver disease, cirrhosis, or liver cancer.    Meds/supplements:  -Rx: atorvastatin x years   -OTC, herbs/vitamins: none    Social history:  Occupation: retired  Exercise: not strenuous; no myalgias   Denies illicit drug use.  Denies recent illness or infection.    No symptoms of hepatic decompensation including jaundice, ascites, cognitive problems to suggest hepatic encephalopathy, or GI bleeding.     Notes some fatigue, will have sleep study soon.         Past medical history, surgical history, problem list, family history, social history, allergies: Reviewed and updated in the appropriate section of the electronic medical record.      Current Outpatient Medications   Medication Sig Dispense Refill    albuterol (PROVENTIL/VENTOLIN HFA) 90 mcg/actuation  inhaler Inhale 2 puffs into the lungs every 4 (four) hours as needed for Wheezing.      atorvastatin (LIPITOR) 40 MG tablet Take 1 tablet by mouth once daily.      clonazePAM (KLONOPIN) 0.5 MG tablet Take 1 tablet (0.5 mg total) by mouth once daily. 30 tablet 0    estradioL (ESTRACE) 1 MG tablet Take 1 tablet (1 mg total) by mouth once daily. 90 tablet 3    ferrous sulfate 325 (65 FE) MG EC tablet Take 1 tablet (325 mg total) by mouth once daily. 30 tablet 5    fluticasone propionate (FLONASE) 50 mcg/actuation nasal spray 1 spray by Each Nostril route.      fluvoxaMINE (LUVOX) 100 MG tablet Take 100 mg by mouth every evening.      furosemide (LASIX) 40 MG tablet Take 1 tablet (40 mg total) by mouth once daily. (Patient not taking: Reported on 10/31/2024) 3 tablet 0    gabapentin (NEURONTIN) 600 MG tablet Take 1 tablet (600 mg total) by mouth 3 (three) times daily. OK to take 600 mg in the morning and 1200 mg at night for pain and restless leg symptoms. This is the max dose for you (1800 mg) 90 tablet 0    levothyroxine (SYNTHROID, LEVOTHROID) 175 MCG tablet Take 175 mcg by mouth once daily.      metFORMIN (GLUCOPHAGE-XR) 500 MG ER 24hr tablet Take 500 mg by mouth Daily.      metoprolol tartrate (LOPRESSOR) 25 MG tablet Take 1 tablet by mouth 2 (two) times daily with meals.      nystatin-triamcinolone (MYCOLOG II) cream Apply topically 2 (two) times daily.      omeprazole (PRILOSEC) 20 MG capsule Take 20 mg by mouth once daily.      promethazine (PHENERGAN) 25 MG tablet Take 1 tablet by mouth every 12 (twelve) hours as needed.      RESTASIS 0.05 % ophthalmic emulsion Place 1 drop into both eyes 2 (two) times daily.      REXULTI 2 mg Tab Take 4 mg by mouth Daily.      rOPINIRole (REQUIP) 2 MG tablet Take 1 tablet by mouth once daily.      spironolactone (ALDACTONE) 25 MG tablet Take 1 tablet by mouth once daily.      tirzepatide (MOUNJARO) 2.5 mg/0.5 mL PnIj Inject 2.5 mg into the skin every 7 days. 4 Pen 1     "traZODone (DESYREL) 150 MG tablet Take 150 mg by mouth every evening.      zolpidem (AMBIEN) 5 MG Tab Take 1 tablet (5 mg total) by mouth nightly as needed (insomnia). 30 tablet 0     No current facility-administered medications for this visit.     Medication list reviewed and updated.      Review of Systems - as per HPI  Constitutional: Negative for unexpected weight change.   Respiratory: Negative for shortness of breath.    Cardiovascular: Negative for leg swelling.  Gastrointestinal: Negative for abdominal distention or abdominal pain. Negative for melena or hematemesis.  Musculoskeletal: Negative for myalgias.    Skin: Negative for jaundice or itching.  Neurological: Negative for confusion or slowed mentation. Negative for tremors.   Hematological: Does not bruise/bleed easily.       Physical Exam - limited by virtual visit  Constitutional: No distress. Alert and oriented.  Pulmonary/Chest: No respiratory distress.   Skin: No jaundice.   Psychiatric: Normal mood and affect. Speech, behavior, and thought content normal.        LABS & DIAGNOSTIC STUDIES     I have personally reviewed pertinent laboratory findings:    Lab Results   Component Value Date    ALT 67 (H) 10/08/2024     (H) 10/08/2024    ALKPHOS 184 (H) 10/08/2024    BILITOT 0.6 10/08/2024    ALBUMIN 3.9 10/08/2024       Lab Results   Component Value Date    WBC 9.87 10/08/2024    HGB 12.0 10/08/2024    HCT 38.3 10/08/2024    MCV 87 10/08/2024     10/08/2024       Lab Results   Component Value Date     10/08/2024    K 3.4 (L) 10/08/2024    BUN 21 10/08/2024    CREATININE 1.2 10/08/2024    ESTGFRAFRICA >60 09/23/2010    EGFRNONAA >60 09/23/2010       Lab Results   Component Value Date    FERRITIN 138 10/08/2024    FESATURATED 14 (L) 10/08/2024    CPK 60 09/23/2010    HEPCAB Reactive (A) 10/08/2024    HEPBDNA <357 09/27/2010       No results found for: "AFP"      I have personally reviewed the following result reports:  Abdominal US - " 10/14/24      ASSESSMENT & PLAN     69 y.o. female with:    1. Elevated liver enzymes    2. Metabolic dysfunction-associated steatotic liver disease (MASLD)    3. Hepatitis C antibody test positive    4. Hyperlipidemia, unspecified hyperlipidemia type    5. Type 2 diabetes mellitus without complication, without long-term current use of insulin    6. Class 1 obesity with serious comorbidity and body mass index (BMI) of 30.0 to 30.9 in adult, unspecified obesity type    7. Dilation of biliary tract        Recommendations:   Obtain Fibroscan for fibrosis and steatosis staging.  Complete serologic workup to rule out other causes of liver disease. AST>ALT, check CK and PETH. Will see if enzymes are improving with taking Mounjaro for the last month.    Repeat HCV RNA once more in 3 months   Biliary dilation may be due to cholecystectomy though this is a notable size increase from US in May. Likely plan for MRI MRCP.     Recommend weight loss goal of 7-10% (about 15 lb).   Mediterranean diet - diet should be low in carbohydrates, added sugars, and processed foods. Recommend increasing protein and fiber intake.    150 min/week of moderate exercise (aerobic + resistance), as tolerated.  Maintain good control of blood pressure, cholesterol, and blood sugar  If no advanced fibrosis, should limit alcohol to max 1 standard drinks/day. Do not recommend daily alcohol use or drinking most days per week.       Orders Placed This Encounter   Procedures    Alpha-1-Antitrypsin    Ceruloplasmin    JOHAN Screen w/Reflex    Antimitochondrial Antibody    Anti-Smooth Muscle Antibody    IgG    CK    Hepatitis A antibody, IgG    Hepatitis B Core Antibody, Total    Hepatitis B Surface Ab, Qualitative    IgM    Hepatitis B Surface Antigen         Return to clinic pending results, likely coordinate same day as Fibroscan.      Thank you for allowing me to participate in the care of Pricilla Chun,  FNP-C  Hepatology        The patient location is: [place], [place]  The chief complaint leading to consultation is: Elevated liver enzymes, fatty liver    Visit type: audiovisual    Face to Face time with patient: 22 min   45 minutes of total time spent on the encounter, which includes face to face time and non-face to face time preparing to see the patient (eg, review of tests), Obtaining and/or reviewing separately obtained history, Documenting clinical information in the electronic or other health record, Independently interpreting results (not separately reported) and communicating results to the patient/family/caregiver, or Care coordination (not separately reported).     Each patient to whom he or she provides medical services by telemedicine is:  (1) informed of the relationship between the physician and patient and the respective role of any other health care provider with respect to management of the patient; and (2) notified that he or she may decline to receive medical services by telemedicine and may withdraw from such care at any time.

## 2024-11-21 ENCOUNTER — LAB VISIT (OUTPATIENT)
Dept: LAB | Facility: HOSPITAL | Age: 69
End: 2024-11-21
Payer: MEDICARE

## 2024-11-21 DIAGNOSIS — E61.1 IRON DEFICIENCY: ICD-10-CM

## 2024-11-21 DIAGNOSIS — R74.8 ELEVATED LIVER ENZYMES: ICD-10-CM

## 2024-11-21 DIAGNOSIS — E11.9 TYPE 2 DIABETES MELLITUS WITHOUT COMPLICATION: ICD-10-CM

## 2024-11-21 LAB
A1AT SERPL-MCNC: 185 MG/DL (ref 100–190)
ALBUMIN SERPL BCP-MCNC: 3.5 G/DL (ref 3.5–5.2)
ALBUMIN/CREAT UR: 29.4 UG/MG (ref 0–30)
ALP SERPL-CCNC: 159 U/L (ref 40–150)
ALT SERPL W/O P-5'-P-CCNC: 49 U/L (ref 10–44)
ANION GAP SERPL CALC-SCNC: 13 MMOL/L (ref 8–16)
AST SERPL-CCNC: 110 U/L (ref 10–40)
BILIRUB SERPL-MCNC: 0.3 MG/DL (ref 0.1–1)
BUN SERPL-MCNC: 12 MG/DL (ref 8–23)
CALCIUM SERPL-MCNC: 9.7 MG/DL (ref 8.7–10.5)
CERULOPLASMIN SERPL-MCNC: 43 MG/DL (ref 15–45)
CHLORIDE SERPL-SCNC: 100 MMOL/L (ref 95–110)
CK SERPL-CCNC: 70 U/L (ref 20–180)
CO2 SERPL-SCNC: 28 MMOL/L (ref 23–29)
CREAT SERPL-MCNC: 0.9 MG/DL (ref 0.5–1.4)
CREAT UR-MCNC: 119 MG/DL (ref 15–325)
EST. GFR  (NO RACE VARIABLE): >60 ML/MIN/1.73 M^2
GLUCOSE SERPL-MCNC: 158 MG/DL (ref 70–110)
HAV IGG SER QL IA: REACTIVE
HBV CORE AB SERPL QL IA: NORMAL
HBV SURFACE AB SER-ACNC: 138.74 MIU/ML
HBV SURFACE AB SER-ACNC: REACTIVE M[IU]/ML
HBV SURFACE AG SERPL QL IA: NORMAL
IGG SERPL-MCNC: 1034 MG/DL (ref 650–1600)
IGM SERPL-MCNC: 169 MG/DL (ref 50–300)
IRON SERPL-MCNC: 35 UG/DL (ref 30–160)
MICROALBUMIN UR DL<=1MG/L-MCNC: 35 UG/ML
POTASSIUM SERPL-SCNC: 3.5 MMOL/L (ref 3.5–5.1)
PROT SERPL-MCNC: 7.9 G/DL (ref 6–8.4)
SATURATED IRON: 7 % (ref 20–50)
SODIUM SERPL-SCNC: 141 MMOL/L (ref 136–145)
TOTAL IRON BINDING CAPACITY: 531 UG/DL (ref 250–450)
TRANSFERRIN SERPL-MCNC: 359 MG/DL (ref 200–375)

## 2024-11-21 PROCEDURE — 86235 NUCLEAR ANTIGEN ANTIBODY: CPT | Mod: 59,HCNC | Performed by: NURSE PRACTITIONER

## 2024-11-21 PROCEDURE — 36415 COLL VENOUS BLD VENIPUNCTURE: CPT | Mod: HCNC,PO | Performed by: NURSE PRACTITIONER

## 2024-11-21 PROCEDURE — 86038 ANTINUCLEAR ANTIBODIES: CPT | Mod: HCNC | Performed by: NURSE PRACTITIONER

## 2024-11-21 PROCEDURE — 86381 MITOCHONDRIAL ANTIBODY EACH: CPT | Mod: HCNC | Performed by: NURSE PRACTITIONER

## 2024-11-21 PROCEDURE — 86706 HEP B SURFACE ANTIBODY: CPT | Mod: HCNC | Performed by: NURSE PRACTITIONER

## 2024-11-21 PROCEDURE — 82784 ASSAY IGA/IGD/IGG/IGM EACH: CPT | Mod: HCNC | Performed by: NURSE PRACTITIONER

## 2024-11-21 PROCEDURE — 86015 ACTIN ANTIBODY EACH: CPT | Mod: HCNC | Performed by: NURSE PRACTITIONER

## 2024-11-21 PROCEDURE — 86039 ANTINUCLEAR ANTIBODIES (ANA): CPT | Mod: HCNC | Performed by: NURSE PRACTITIONER

## 2024-11-21 PROCEDURE — 82784 ASSAY IGA/IGD/IGG/IGM EACH: CPT | Mod: 59,HCNC | Performed by: NURSE PRACTITIONER

## 2024-11-21 PROCEDURE — 80053 COMPREHEN METABOLIC PANEL: CPT | Mod: HCNC | Performed by: NURSE PRACTITIONER

## 2024-11-21 PROCEDURE — 87340 HEPATITIS B SURFACE AG IA: CPT | Mod: HCNC | Performed by: NURSE PRACTITIONER

## 2024-11-21 PROCEDURE — 82390 ASSAY OF CERULOPLASMIN: CPT | Mod: HCNC | Performed by: NURSE PRACTITIONER

## 2024-11-21 PROCEDURE — 83540 ASSAY OF IRON: CPT | Mod: HCNC | Performed by: NURSE PRACTITIONER

## 2024-11-21 PROCEDURE — 86790 VIRUS ANTIBODY NOS: CPT | Mod: HCNC | Performed by: NURSE PRACTITIONER

## 2024-11-21 PROCEDURE — 82570 ASSAY OF URINE CREATININE: CPT | Mod: HCNC | Performed by: NURSE PRACTITIONER

## 2024-11-21 PROCEDURE — 82103 ALPHA-1-ANTITRYPSIN TOTAL: CPT | Mod: HCNC | Performed by: NURSE PRACTITIONER

## 2024-11-21 PROCEDURE — 82550 ASSAY OF CK (CPK): CPT | Mod: HCNC | Performed by: NURSE PRACTITIONER

## 2024-11-21 PROCEDURE — 86704 HEP B CORE ANTIBODY TOTAL: CPT | Mod: HCNC | Performed by: NURSE PRACTITIONER

## 2024-11-22 ENCOUNTER — OFFICE VISIT (OUTPATIENT)
Dept: PRIMARY CARE CLINIC | Facility: CLINIC | Age: 69
End: 2024-11-22
Payer: MEDICARE

## 2024-11-22 VITALS
WEIGHT: 181.69 LBS | BODY MASS INDEX: 30.27 KG/M2 | TEMPERATURE: 99 F | HEIGHT: 65 IN | HEART RATE: 79 BPM | DIASTOLIC BLOOD PRESSURE: 86 MMHG | SYSTOLIC BLOOD PRESSURE: 122 MMHG | OXYGEN SATURATION: 99 %

## 2024-11-22 DIAGNOSIS — I10 HYPERTENSION, UNSPECIFIED TYPE: ICD-10-CM

## 2024-11-22 DIAGNOSIS — T30.0 BURN: ICD-10-CM

## 2024-11-22 DIAGNOSIS — E11.9 TYPE 2 DIABETES MELLITUS WITHOUT COMPLICATION, WITHOUT LONG-TERM CURRENT USE OF INSULIN: Primary | ICD-10-CM

## 2024-11-22 DIAGNOSIS — Z23 NEED FOR PNEUMOCOCCAL VACCINE: ICD-10-CM

## 2024-11-22 DIAGNOSIS — E61.1 IRON DEFICIENCY: ICD-10-CM

## 2024-11-22 LAB
ANA PATTERN 1: NORMAL
ANA PATTERN 2: NORMAL
ANA SER QL IF: POSITIVE
ANA TITER 2: NORMAL
ANA TITR SER IF: NORMAL {TITER}

## 2024-11-22 PROCEDURE — 3066F NEPHROPATHY DOC TX: CPT | Mod: HCNC,CPTII,S$GLB, | Performed by: NURSE PRACTITIONER

## 2024-11-22 PROCEDURE — 3079F DIAST BP 80-89 MM HG: CPT | Mod: HCNC,CPTII,S$GLB, | Performed by: NURSE PRACTITIONER

## 2024-11-22 PROCEDURE — 3074F SYST BP LT 130 MM HG: CPT | Mod: HCNC,CPTII,S$GLB, | Performed by: NURSE PRACTITIONER

## 2024-11-22 PROCEDURE — 3061F NEG MICROALBUMINURIA REV: CPT | Mod: HCNC,CPTII,S$GLB, | Performed by: NURSE PRACTITIONER

## 2024-11-22 PROCEDURE — 3008F BODY MASS INDEX DOCD: CPT | Mod: HCNC,CPTII,S$GLB, | Performed by: NURSE PRACTITIONER

## 2024-11-22 PROCEDURE — 99214 OFFICE O/P EST MOD 30 MIN: CPT | Mod: HCNC,25,S$GLB, | Performed by: NURSE PRACTITIONER

## 2024-11-22 PROCEDURE — 1101F PT FALLS ASSESS-DOCD LE1/YR: CPT | Mod: HCNC,CPTII,S$GLB, | Performed by: NURSE PRACTITIONER

## 2024-11-22 PROCEDURE — 3044F HG A1C LEVEL LT 7.0%: CPT | Mod: HCNC,CPTII,S$GLB, | Performed by: NURSE PRACTITIONER

## 2024-11-22 PROCEDURE — 1159F MED LIST DOCD IN RCRD: CPT | Mod: HCNC,CPTII,S$GLB, | Performed by: NURSE PRACTITIONER

## 2024-11-22 PROCEDURE — 90677 PCV20 VACCINE IM: CPT | Mod: HCNC,S$GLB,, | Performed by: NURSE PRACTITIONER

## 2024-11-22 PROCEDURE — 1126F AMNT PAIN NOTED NONE PRSNT: CPT | Mod: HCNC,CPTII,S$GLB, | Performed by: NURSE PRACTITIONER

## 2024-11-22 PROCEDURE — 99999 PR PBB SHADOW E&M-EST. PATIENT-LVL V: CPT | Mod: PBBFAC,HCNC,, | Performed by: NURSE PRACTITIONER

## 2024-11-22 PROCEDURE — G0009 ADMIN PNEUMOCOCCAL VACCINE: HCPCS | Mod: HCNC,S$GLB,, | Performed by: NURSE PRACTITIONER

## 2024-11-22 PROCEDURE — 3288F FALL RISK ASSESSMENT DOCD: CPT | Mod: HCNC,CPTII,S$GLB, | Performed by: NURSE PRACTITIONER

## 2024-11-22 RX ORDER — SILVER SULFADIAZINE 10 G/1000G
CREAM TOPICAL DAILY
Qty: 20 G | Refills: 0 | Status: SHIPPED | OUTPATIENT
Start: 2024-11-22 | End: 2024-11-22 | Stop reason: CLARIF

## 2024-11-22 RX ORDER — TIRZEPATIDE 5 MG/.5ML
5 INJECTION, SOLUTION SUBCUTANEOUS
Qty: 4 PEN | Refills: 2 | Status: SHIPPED | OUTPATIENT
Start: 2024-11-22

## 2024-11-22 RX ORDER — CLINDAMYCIN HYDROCHLORIDE 300 MG/1
300 CAPSULE ORAL 3 TIMES DAILY
Qty: 21 CAPSULE | Refills: 0 | Status: SHIPPED | OUTPATIENT
Start: 2024-11-22 | End: 2024-11-29

## 2024-11-22 RX ORDER — SPIRONOLACTONE 25 MG/1
25 TABLET ORAL DAILY
Qty: 30 TABLET | Refills: 5 | Status: SHIPPED | OUTPATIENT
Start: 2024-11-22 | End: 2024-11-22 | Stop reason: SDUPTHER

## 2024-11-22 RX ORDER — SILVER SULFADIAZINE 10 G/1000G
CREAM TOPICAL DAILY
Qty: 20 G | Refills: 0 | Status: SHIPPED | OUTPATIENT
Start: 2024-11-22

## 2024-11-22 RX ORDER — METOPROLOL TARTRATE 25 MG/1
25 TABLET, FILM COATED ORAL 2 TIMES DAILY WITH MEALS
Qty: 60 TABLET | Refills: 5 | Status: SHIPPED | OUTPATIENT
Start: 2024-11-22 | End: 2024-11-22 | Stop reason: SDUPTHER
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
## 2024-11-23 LAB
MITOCHONDRIA AB TITR SER IF: NORMAL {TITER}
SMOOTH MUSCLE AB TITR SER IF: NORMAL {TITER}

## 2024-11-23 NOTE — TELEPHONE ENCOUNTER
Refill Routing Note   Medication(s) are not appropriate for processing by [facility] for the following reason(s):        Non-participating provider    [facility] action(s):  Route               Appointments  past 12m or future 3m with PCP    Date Provider   Last Visit   11/22/2024 Susanne Reyna NP   Next Visit   2/21/2025 Susanne Reyna NP   ED visits in past 90 days: 0        Note composed:6:28 PM 11/22/2024

## 2024-11-25 ENCOUNTER — PATIENT OUTREACH (OUTPATIENT)
Dept: ADMINISTRATIVE | Facility: HOSPITAL | Age: 69
End: 2024-11-25
Payer: MEDICARE

## 2024-11-25 DIAGNOSIS — R76.8 HEPATITIS C ANTIBODY TEST POSITIVE: ICD-10-CM

## 2024-11-25 DIAGNOSIS — R74.8 ELEVATED LIVER ENZYMES: Primary | ICD-10-CM

## 2024-11-25 DIAGNOSIS — K83.8 DILATION OF BILIARY TRACT: ICD-10-CM

## 2024-11-25 DIAGNOSIS — E61.1 IRON DEFICIENCY: Primary | ICD-10-CM

## 2024-11-25 LAB
ANTI SM ANTIBODY: 0.15 RATIO (ref 0–0.99)
ANTI SM/RNP ANTIBODY: 0.12 RATIO (ref 0–0.99)
ANTI-SM INTERPRETATION: NEGATIVE
ANTI-SM/RNP INTERPRETATION: NEGATIVE
ANTI-SSA ANTIBODY: 0.11 RATIO (ref 0–0.99)
ANTI-SSA INTERPRETATION: NEGATIVE
ANTI-SSB ANTIBODY: 0.17 RATIO (ref 0–0.99)
ANTI-SSB INTERPRETATION: NEGATIVE
DSDNA AB SER-ACNC: NORMAL [IU]/ML

## 2024-11-25 RX ORDER — METOPROLOL TARTRATE 25 MG/1
25 TABLET, FILM COATED ORAL 2 TIMES DAILY WITH MEALS
Qty: 60 TABLET | Refills: 5 | Status: SHIPPED | OUTPATIENT
Start: 2024-11-25

## 2024-11-25 RX ORDER — SPIRONOLACTONE 25 MG/1
25 TABLET ORAL DAILY
Qty: 30 TABLET | Refills: 5 | Status: SHIPPED | OUTPATIENT
Start: 2024-11-25

## 2024-11-25 NOTE — PATIENT INSTRUCTIONS
Labs to monitor liver enzymes and rule out other causes of liver disease  Likely plan for Fibroscan (in clinic) to evaluate for liver fibrosis/damage from fatty liver  May obtain MRI to take a closer look at bile ducts

## 2024-11-25 NOTE — LETTER
AUTHORIZATION FOR RELEASE OF   CONFIDENTIAL INFORMATION      We are seeing Pricilla Herman, date of birth 1955, in the clinic at [facility]. Susanne Reyna NP is the patient's PCP. Pricilla Herman has an outstanding lab/procedure at the time we reviewed her chart. In order to help keep her health information updated, she has authorized us to request the following medical record(s):                                                ( x )  EYE EXAM                 Please fax records to [facility], Johnson, Kassidy A., NP,  at [phone] or email to [email or web address].             Patient Name: Pricilla Herman  : 1955  Patient Phone #: [phone]

## 2024-11-26 ENCOUNTER — TELEPHONE (OUTPATIENT)
Dept: HEPATOLOGY | Facility: CLINIC | Age: 69
End: 2024-11-26
Payer: MEDICARE

## 2024-11-26 DIAGNOSIS — R74.8 ELEVATED LIVER ENZYMES: ICD-10-CM

## 2024-11-26 DIAGNOSIS — K76.0 METABOLIC DYSFUNCTION-ASSOCIATED STEATOTIC LIVER DISEASE (MASLD): Primary | ICD-10-CM

## 2024-11-26 NOTE — TELEPHONE ENCOUNTER
Pt's appt were all schld with her.    Lauren    ----- Message from Brenda Chun NP sent at 11/26/2024  3:23 PM CST -----  Orders are in and I linked them. She needs a f/u visit any time after testing to discuss results. Thanks.  ----- Message -----  From: Lauren Lees MA  Sent: 11/26/2024   1:58 PM CST  To: Brenda Chun NP    Pls place fibroscan orders so they can be linked to appt.  ----- Message -----  From: Brenda Chun NP  Sent: 11/25/2024   2:58 PM CST  To: Adiel Gutierrez Staff    Please schedule MRCP, Fibroscan, and f/u visit. Thanks.

## 2024-11-26 NOTE — TELEPHONE ENCOUNTER
Pt appt's were all schld. Pt was placed on wait list for sooner follow up virtual visit.     Lauren    ----- Message from Brenda Chun NP sent at 11/26/2024  3:23 PM CST -----  Orders are in and I linked them. She needs a f/u visit any time after testing to discuss results. Thanks.  ----- Message -----  From: Lauren Lees MA  Sent: 11/26/2024   1:58 PM CST  To: Brenda Chun NP    Pls place fibroscan orders so they can be linked to appt.  ----- Message -----  From: Brenda Chun NP  Sent: 11/25/2024   2:58 PM CST  To: Adiel Gutierrez Staff    Please schedule MRCP, Fibroscan, and f/u visit. Thanks.

## 2024-12-06 ENCOUNTER — OFFICE VISIT (OUTPATIENT)
Dept: PRIMARY CARE CLINIC | Facility: CLINIC | Age: 69
End: 2024-12-06
Payer: MEDICARE

## 2024-12-06 VITALS
TEMPERATURE: 98 F | WEIGHT: 176.56 LBS | DIASTOLIC BLOOD PRESSURE: 72 MMHG | SYSTOLIC BLOOD PRESSURE: 130 MMHG | BODY MASS INDEX: 29.39 KG/M2 | HEART RATE: 76 BPM | OXYGEN SATURATION: 97 %

## 2024-12-06 DIAGNOSIS — F41.9 ANXIETY: Primary | ICD-10-CM

## 2024-12-06 DIAGNOSIS — F32.A DEPRESSION, UNSPECIFIED DEPRESSION TYPE: ICD-10-CM

## 2024-12-06 PROCEDURE — 99999 PR PBB SHADOW E&M-EST. PATIENT-LVL IV: CPT | Mod: PBBFAC,HCNC,, | Performed by: NURSE PRACTITIONER

## 2024-12-06 PROCEDURE — 99214 OFFICE O/P EST MOD 30 MIN: CPT | Mod: HCNC,S$GLB,, | Performed by: NURSE PRACTITIONER

## 2024-12-06 PROCEDURE — G2211 COMPLEX E/M VISIT ADD ON: HCPCS | Mod: HCNC,S$GLB,, | Performed by: NURSE PRACTITIONER
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
## 2024-12-15 DIAGNOSIS — E61.1 IRON DEFICIENCY: ICD-10-CM

## 2024-12-16 ENCOUNTER — PATIENT OUTREACH (OUTPATIENT)
Dept: ADMINISTRATIVE | Facility: HOSPITAL | Age: 69
End: 2024-12-16
Payer: MEDICARE

## 2024-12-16 ENCOUNTER — HOSPITAL ENCOUNTER (OUTPATIENT)
Dept: RADIOLOGY | Facility: HOSPITAL | Age: 69
Discharge: HOME | End: 2024-12-16
Attending: NURSE PRACTITIONER
Payer: MEDICARE

## 2024-12-16 ENCOUNTER — PROCEDURE VISIT (OUTPATIENT)
Facility: CLINIC | Age: 69
End: 2024-12-16
Payer: MEDICARE

## 2024-12-16 DIAGNOSIS — K76.0 METABOLIC DYSFUNCTION-ASSOCIATED STEATOTIC LIVER DISEASE (MASLD): ICD-10-CM

## 2024-12-16 DIAGNOSIS — K83.8 DILATION OF BILIARY TRACT: ICD-10-CM

## 2024-12-16 DIAGNOSIS — R74.8 ELEVATED LIVER ENZYMES: ICD-10-CM

## 2024-12-16 PROCEDURE — 76376 3D RENDER W/INTRP POSTPROCES: CPT | Mod: TC,HCNC,PO

## 2024-12-16 PROCEDURE — 91200 LIVER ELASTOGRAPHY: CPT | Mod: HCNC,S$GLB,, | Performed by: NURSE PRACTITIONER

## 2024-12-16 RX ORDER — FERROUS SULFATE 325(65) MG
325 TABLET, DELAYED RELEASE (ENTERIC COATED) ORAL DAILY
Qty: 30 TABLET | Refills: 5 | Status: SHIPPED | OUTPATIENT
Start: 2024-12-16 | End: 2024-12-17 | Stop reason: SDUPTHER

## 2024-12-16 RX ORDER — ESTRADIOL 1 MG/1
1 TABLET ORAL DAILY
Qty: 90 TABLET | Refills: 3 | Status: SHIPPED | OUTPATIENT
Start: 2024-12-16

## 2024-12-16 NOTE — PROGRESS NOTES
VBC Program Closed: Does Not Meet Criteria for Program. Patient is not due for any topics at this time.

## 2024-12-16 NOTE — TELEPHONE ENCOUNTER
Refill Routing Note   Medication(s) are not appropriate for processing by [facility] for the following reason(s):        Non-participating provider    [facility] action(s):  Route             Appointments  past 12m or future 3m with PCP    Date Provider   Last Visit   12/6/2024 Susanne Reyna NP   Next Visit   2/21/2025 Susanne Reyna NP   ED visits in past 90 days: 0        Note composed:10:56 AM 12/16/2024

## 2024-12-16 NOTE — TELEPHONE ENCOUNTER
Refill Routing Note   Medication(s) are not appropriate for processing by [facility] for the following reason(s):        Non-participating provider    [facility] action(s):  Route             Appointments  past 12m or future 3m with PCP    Date Provider   Last Visit   12/6/2024 Susanne Reyna NP   Next Visit   12/15/2024 Susanne Reyna NP   ED visits in past 90 days: 0        Note composed:10:56 AM 12/16/2024

## 2024-12-17 ENCOUNTER — OFFICE VISIT (OUTPATIENT)
Dept: PRIMARY CARE CLINIC | Facility: CLINIC | Age: 69
End: 2024-12-17
Payer: MEDICARE

## 2024-12-17 VITALS
BODY MASS INDEX: 28.34 KG/M2 | OXYGEN SATURATION: 97 % | WEIGHT: 170.31 LBS | SYSTOLIC BLOOD PRESSURE: 112 MMHG | HEART RATE: 75 BPM | DIASTOLIC BLOOD PRESSURE: 70 MMHG

## 2024-12-17 DIAGNOSIS — K21.9 GASTROESOPHAGEAL REFLUX DISEASE, UNSPECIFIED WHETHER ESOPHAGITIS PRESENT: Chronic | ICD-10-CM

## 2024-12-17 DIAGNOSIS — E11.9 TYPE 2 DIABETES MELLITUS WITHOUT COMPLICATION, WITHOUT LONG-TERM CURRENT USE OF INSULIN: ICD-10-CM

## 2024-12-17 DIAGNOSIS — E07.9 THYROID DISEASE: ICD-10-CM

## 2024-12-17 DIAGNOSIS — G25.81 RLS (RESTLESS LEGS SYNDROME): Primary | ICD-10-CM

## 2024-12-17 DIAGNOSIS — E61.1 IRON DEFICIENCY: ICD-10-CM

## 2024-12-17 PROCEDURE — 1126F AMNT PAIN NOTED NONE PRSNT: CPT | Mod: HCNC,CPTII,S$GLB, | Performed by: NURSE PRACTITIONER

## 2024-12-17 PROCEDURE — G2211 COMPLEX E/M VISIT ADD ON: HCPCS | Mod: HCNC,S$GLB,, | Performed by: NURSE PRACTITIONER

## 2024-12-17 PROCEDURE — 3044F HG A1C LEVEL LT 7.0%: CPT | Mod: HCNC,CPTII,S$GLB, | Performed by: NURSE PRACTITIONER

## 2024-12-17 PROCEDURE — 3008F BODY MASS INDEX DOCD: CPT | Mod: HCNC,CPTII,S$GLB, | Performed by: NURSE PRACTITIONER

## 2024-12-17 PROCEDURE — 3074F SYST BP LT 130 MM HG: CPT | Mod: HCNC,CPTII,S$GLB, | Performed by: NURSE PRACTITIONER

## 2024-12-17 PROCEDURE — 3066F NEPHROPATHY DOC TX: CPT | Mod: HCNC,CPTII,S$GLB, | Performed by: NURSE PRACTITIONER

## 2024-12-17 PROCEDURE — 3078F DIAST BP <80 MM HG: CPT | Mod: HCNC,CPTII,S$GLB, | Performed by: NURSE PRACTITIONER

## 2024-12-17 PROCEDURE — 99214 OFFICE O/P EST MOD 30 MIN: CPT | Mod: HCNC,S$GLB,, | Performed by: NURSE PRACTITIONER

## 2024-12-17 PROCEDURE — 99999 PR PBB SHADOW E&M-EST. PATIENT-LVL IV: CPT | Mod: PBBFAC,HCNC,, | Performed by: NURSE PRACTITIONER

## 2024-12-17 PROCEDURE — 3288F FALL RISK ASSESSMENT DOCD: CPT | Mod: HCNC,CPTII,S$GLB, | Performed by: NURSE PRACTITIONER

## 2024-12-17 PROCEDURE — 1101F PT FALLS ASSESS-DOCD LE1/YR: CPT | Mod: HCNC,CPTII,S$GLB, | Performed by: NURSE PRACTITIONER

## 2024-12-17 PROCEDURE — 1159F MED LIST DOCD IN RCRD: CPT | Mod: HCNC,CPTII,S$GLB, | Performed by: NURSE PRACTITIONER

## 2024-12-17 PROCEDURE — 2023F DILAT RTA XM W/O RTNOPTHY: CPT | Mod: HCNC,CPTII,S$GLB, | Performed by: NURSE PRACTITIONER

## 2024-12-17 PROCEDURE — 3061F NEG MICROALBUMINURIA REV: CPT | Mod: HCNC,CPTII,S$GLB, | Performed by: NURSE PRACTITIONER

## 2024-12-18 RX ORDER — ROPINIROLE 2 MG/1
2 TABLET, FILM COATED ORAL DAILY
Qty: 90 TABLET | Refills: 2 | Status: SHIPPED | OUTPATIENT
Start: 2024-12-18

## 2024-12-18 RX ORDER — OMEPRAZOLE 20 MG/1
20 CAPSULE, DELAYED RELEASE ORAL DAILY
Qty: 90 CAPSULE | Refills: 2 | Status: SHIPPED | OUTPATIENT
Start: 2024-12-18

## 2024-12-18 RX ORDER — METFORMIN HYDROCHLORIDE 500 MG/1
500 TABLET, EXTENDED RELEASE ORAL DAILY
Qty: 90 TABLET | Refills: 2 | Status: SHIPPED | OUTPATIENT
Start: 2024-12-18

## 2024-12-18 RX ORDER — LEVOTHYROXINE SODIUM 175 UG/1
175 TABLET ORAL DAILY
Qty: 90 TABLET | Refills: 2 | Status: SHIPPED | OUTPATIENT
Start: 2024-12-18

## 2024-12-18 RX ORDER — FERROUS SULFATE 325(65) MG
325 TABLET, DELAYED RELEASE (ENTERIC COATED) ORAL DAILY
Qty: 30 TABLET | Refills: 5 | Status: SHIPPED | OUTPATIENT
Start: 2024-12-18

## 2024-12-18 NOTE — PROGRESS NOTES
I have attempted to contact this patient by phone with the following results: no answer, left message to return my call on answering machine.

## 2024-12-18 NOTE — PROCEDURES
FibroScan Transplant Hepatology(Vibration Controlled Transient Elastography)    Date/Time: 12/16/2024 8:30 AM    Performed by: Brenda Chun NP  Authorized by: Brenda Chun NP    Diagnosis:  NAFLD    Probe:  XL    Universal Protocol: Patient's identity, procedure and site were verified, confirmatory pause was performed.  Discussed procedure including risks and potential complications.  Questions answered.  Patient verbalizes understanding and wishes to proceed with VCTE.     Procedure: After providing explanations of the procedure, patient was placed in the supine position with right arm in maximum abduction to allow optimal exposure of right lateral abdomen.  Patient was briefly assessed, Testing was performed in the mid-axillary location, 50Hz Shear Wave pulses were applied and the resulting Shear Wave and Propagation Speed detected with a 3.5 MHz ultrasonic signal, using the FibroScan probe, Skin to liver capsule distance and liver parenchyma were accessed during the entire examination with the FibroScan probe, Patient was instructed to breathe normally and to abstain from sudden movements during the procedure, allowing for random measurements of liver stiffness. At least 10 Shear Waves were produced, Individual measurements of each Shear Wave were calculated.  Patient tolerated the procedure well with no complications.  Meets discharge criteria as was dismissed.  Rates pain 0 out of 10.  Patient will follow up with ordering provider to review results.    Findings  Median liver stiffness score:  8  CAP Reading: dB/m:  261    IQR/med %:  15  Interpretation  Fibrosis interpretation is based on medial liver stiffness - Kilopascal (kPa).    Fibrosis Stage:  F 0-1  Steatosis interpretation is based on controlled attenuation parameter - (dB/m).    Steatosis Grade:  <S1

## 2024-12-18 NOTE — PROGRESS NOTES
Assessment/Plan:    Problem List Items Addressed This Visit       Diabetes mellitus, type 2    Overview     Diabetes Medications               metFORMIN (GLUCOPHAGE-XR) 500 MG ER 24hr tablet Take 500 mg by mouth Daily.        -  Lab Results   Component Value Date    HGBA1C 6.7 (H) 10/08/2024      -see diabetic health maintenance listed below  -on statin: Yes  -on ACE-I/ARB: No  -counseling provided on importance of diabetic diet and medication compliance in order to treat diabetes  -discussed diabetes disease course and potential complications     10/25/24  -Plan to  start  Mounjaro.  Discussed risks and benefits of tirzepatide therapy.  Reviewed patient's family history and personal history for thyroid C cell type tumor and MEN syndrome.  Patient denies a history of these disorders or syndromes.  Patient was aware of increased risk of pancreatitis and worsening of these conditions in  animal studies.  Also discussed side effects of nausea vomiting diarrhea and abdominal pain.  Patient should notify me immediately if having any of these symptoms.  ER precautions were given.     11/22/25  - Increase dose of Mounjaro to 5 mg weekly            Relevant Medications    metFORMIN (GLUCOPHAGE-XR) 500 MG ER 24hr tablet    RLS (restless legs syndrome) - Primary    Overview     She reports restless leg syndrome, effectively managed with Requip.          Relevant Medications    rOPINIRole (REQUIP) 2 MG tablet    Thyroid disease    Overview     She has acquired hypothyroidism, treated with levothyroxine 175 mcg. A recent thyroid ultrasound was consistent with chronic thyroiditis. She denies any recent changes in thyroid-related symptoms.         Relevant Medications    levothyroxine (SYNTHROID, LEVOTHROID) 175 MCG tablet    Other Relevant Orders    TSH    GERD (gastroesophageal reflux disease) (Chronic)    Overview      Chronic.  Stable.    Continue Prilosec.         Relevant Medications    omeprazole (PRILOSEC) 20 MG capsule     Iron deficiency    Overview     Chronic.   Continue iron supplement.   Hematology consult for iron transfusions.          Relevant Medications    ferrous sulfate 325 (65 FE) MG EC tablet       Follow up if symptoms worsen or fail to improve.    Susanne Reyna, SOCO  _____________________________________________________________________________________________________________________________________________________    History of Present Illness    CHIEF COMPLAINT:  Ms. Herman presents today for  medication refills.    She reports she will be moving to [place] with her daughter after Christmas and is requesting refills on her medication.  She will re-establish care with her old PCP in [place].    SOCIAL HISTORY:  She reports current residence in an  and expresses desire for more permanent housing with standard amenities.          No other new complaints today.  Remaining chronic conditions have been reviewed and remain stable. Further detail as stated above.     HM reviewed.     No recent changes to medical/surgical history.    Current Outpatient Medications on File Prior to Visit   Medication Sig Dispense Refill    albuterol (PROVENTIL/VENTOLIN HFA) 90 mcg/actuation inhaler Inhale 2 puffs into the lungs every 4 (four) hours as needed for Wheezing.      atorvastatin (LIPITOR) 40 MG tablet Take 1 tablet by mouth once daily.      BELSOMRA 10 mg Tab 10 mg.      clonazePAM (KLONOPIN) 0.5 MG tablet Take 1 tablet (0.5 mg total) by mouth once daily. 30 tablet 0    estradioL (ESTRACE) 1 MG tablet Take 1 tablet (1 mg total) by mouth once daily. 90 tablet 3    fluticasone propionate (FLONASE) 50 mcg/actuation nasal spray 1 spray by Each Nostril route.      gabapentin (NEURONTIN) 600 MG tablet Take 1 tablet (600 mg total) by mouth 3 (three) times daily. OK to take 600 mg in the morning and 1200 mg at night for pain and restless leg symptoms. This is the max dose for you (1800 mg) 90 tablet 0    metoprolol tartrate  (LOPRESSOR) 25 MG tablet Take 1 tablet (25 mg total) by mouth 2 (two) times daily with meals. 60 tablet 5    nystatin-triamcinolone (MYCOLOG II) cream Apply topically 2 (two) times daily.      promethazine (PHENERGAN) 25 MG tablet Take 1 tablet by mouth every 12 (twelve) hours as needed.      RESTASIS 0.05 % ophthalmic emulsion Place 1 drop into both eyes 2 (two) times daily.      REXULTI 2 mg Tab Take 4 mg by mouth Daily.      silver sulfADIAZINE 1% (SILVADENE) 1 % cream Apply topically once daily. 20 g 0    spironolactone (ALDACTONE) 25 MG tablet Take 1 tablet (25 mg total) by mouth once daily. 30 tablet 5    tirzepatide (MOUNJARO) 5 mg/0.5 mL PnIj Inject 5 mg into the skin every 7 days. 4 Pen 2    traZODone (DESYREL) 100 MG tablet Take 1 tablet by mouth every evening.      traZODone (DESYREL) 150 MG tablet Take 150 mg by mouth every evening.      TRINTELLIX 10 mg Tab 10 mg.      zolpidem (AMBIEN) 5 MG Tab Take 1 tablet (5 mg total) by mouth nightly as needed (insomnia). 30 tablet 0     No current facility-administered medications on file prior to visit.       Review of Systems   Constitutional:  Negative for activity change and unexpected weight change.   HENT:  Negative for hearing loss, rhinorrhea and trouble swallowing.    Eyes:  Negative for discharge and visual disturbance.   Respiratory:  Negative for chest tightness and wheezing.    Cardiovascular:  Negative for chest pain and palpitations.   Gastrointestinal:  Negative for blood in stool, constipation, diarrhea and vomiting.   Endocrine: Negative for polydipsia and polyuria.   Genitourinary:  Negative for difficulty urinating, dysuria, hematuria and menstrual problem.   Musculoskeletal:  Negative for arthralgias, joint swelling and neck pain.   Neurological:  Negative for weakness and headaches.   Psychiatric/Behavioral:  Negative for confusion and dysphoric mood.        Vitals:    12/17/24 0841   BP: 112/70   Patient Position: Sitting   Pulse: 75   SpO2:  97%   Weight: 77.3 kg (170 lb 4.9 oz)       Wt Readings from Last 3 Encounters:   12/17/24 77.3 kg (170 lb 4.9 oz)   12/06/24 80.1 kg (176 lb 9.4 oz)   11/22/24 82.4 kg (181 lb 10.5 oz)       Physical Exam  Vitals and nursing note reviewed.   Constitutional:       Appearance: She is well-developed. She is obese.   HENT:      Head: Normocephalic and atraumatic.      Right Ear: Tympanic membrane normal.      Left Ear: Tympanic membrane normal.      Nose: Nose normal.      Mouth/Throat:      Mouth: Mucous membranes are moist.   Eyes:      Conjunctiva/sclera: Conjunctivae normal.      Pupils: Pupils are equal, round, and reactive to light.   Cardiovascular:      Rate and Rhythm: Normal rate and regular rhythm.      Heart sounds: Normal heart sounds.   Pulmonary:      Effort: Pulmonary effort is normal. No respiratory distress.      Breath sounds: Normal breath sounds.   Abdominal:      General: Bowel sounds are normal. There is no distension.      Palpations: Abdomen is soft.   Musculoskeletal:         General: Normal range of motion.      Cervical back: Normal range of motion and neck supple.   Skin:     General: Skin is warm and dry.      Capillary Refill: Capillary refill takes less than 2 seconds.   Neurological:      General: No focal deficit present.      Mental Status: She is alert and oriented to person, place, and time.      Deep Tendon Reflexes: Reflexes are normal and symmetric.   Psychiatric:         Behavior: Behavior normal.         Thought Content: Thought content normal.         Judgment: Judgment normal.         Health Maintenance   Topic Date Due    Shingles Vaccine (2 of 2) 07/10/2024    COVID-19 Vaccine (2 - 2024-25 season) 09/01/2024    Mammogram  02/26/2025    Hemoglobin A1c  04/08/2025    Eye Exam  09/27/2025    Foot Exam  10/07/2025    Lipid Panel  10/08/2025    Diabetes Urine Screening  11/21/2025    DEXA Scan  10/08/2027    TETANUS VACCINE  04/04/2028    Colorectal Cancer Screening  07/07/2033     Hepatitis C Screening  Completed    Influenza Vaccine  Completed    RSV Vaccine (Age 60+ and Pregnant patients)  Completed    Pneumococcal Vaccines (Age 65+)  Completed       This note was generated with the assistance of ambient listening technology. Verbal consent was obtained by the patient and accompanying visitor(s) for the recording of patient appointment to facilitate this note. I attest to having reviewed and edited the generated note for accuracy, though some syntax or spelling errors may persist. Please contact the author of this note for any clarification.

## 2024-12-20 DIAGNOSIS — F41.9 ANXIETY: ICD-10-CM

## 2024-12-23 ENCOUNTER — LAB VISIT (OUTPATIENT)
Dept: LAB | Facility: HOSPITAL | Age: 69
End: 2024-12-23
Payer: MEDICARE

## 2024-12-23 DIAGNOSIS — E07.9 THYROID DISEASE: ICD-10-CM

## 2024-12-23 LAB — TSH SERPL DL<=0.005 MIU/L-ACNC: 1.1 UIU/ML (ref 0.4–4)

## 2024-12-23 PROCEDURE — 84443 ASSAY THYROID STIM HORMONE: CPT | Mod: HCNC | Performed by: NURSE PRACTITIONER

## 2024-12-23 PROCEDURE — 36415 COLL VENOUS BLD VENIPUNCTURE: CPT | Mod: HCNC,PO | Performed by: NURSE PRACTITIONER

## 2024-12-23 RX ORDER — CLONAZEPAM 0.5 MG/1
0.5 TABLET ORAL DAILY
Qty: 30 TABLET | Refills: 0 | Status: SHIPPED | OUTPATIENT
Start: 2024-12-23

## 2024-12-23 NOTE — TELEPHONE ENCOUNTER
Med was sent     clonazePAM (KLONOPIN) 0.5 MG tablet 0.5 mg, Daily             Summary: Take 1 tablet (0.5 mg total) by mouth once daily., Starting Mon 12/23/2024, Normal  Dose, Route, Frequency: 0.5 mg, Oral, DailyStart: 12/23/2024Ord/Sold: 12/23/2024 (O)Ordered On: 12/23/2024Pharmacy: [id number] - [place], [place] - [address] AT [facility] [address] Associated: Long-term: Med Note:              Patient Sig: Take 1 tablet (0.5 mg total) by mouth once daily.  Ordering Department: [facility]  Authorized By: Melinda Castaneda PA-C  Dispense: 30 tablet  Refills: 0 ordered  Prior Authorization:   Request PA  Dose History        5

## 2025-01-03 DIAGNOSIS — E07.9 THYROID DISEASE: Primary | ICD-10-CM

## 2025-01-06 ENCOUNTER — TELEPHONE (OUTPATIENT)
Dept: FAMILY MEDICINE | Facility: CLINIC | Age: 70
End: 2025-01-06
Payer: MEDICARE

## 2025-01-06 NOTE — TELEPHONE ENCOUNTER
Pt states she was seen at an Urgent Care clinic for a bladder infection on 1/5/25 and the clinic advised her she would need a referral for that visit. Informed pt that we are unable to submit a referral for an Urgent Care clinic, pt verbalized understanding.

## 2025-01-06 NOTE — TELEPHONE ENCOUNTER
----- Message from Mignon sent at 1/6/2025  3:13 PM CST -----  .Type: Patient Call Back        Who called:   Patient      What is the request in detail:    Called in concerning needing a referral . Patient is currently in [place] due to a bladder infection. Please fax over referral    Can the clinic reply by [facility]?           Would the patient rather a call back or a response via [facility]?   call      Best call back number:  [phone]

## 2025-01-08 RX ORDER — ATORVASTATIN CALCIUM 40 MG/1
40 TABLET, FILM COATED ORAL DAILY
Qty: 90 TABLET | Refills: 3 | Status: SHIPPED | OUTPATIENT
Start: 2025-01-08

## 2025-01-08 NOTE — TELEPHONE ENCOUNTER
----- Message from Diane sent at 1/8/2025 11:18 AM CST -----  Contact: Charu with [facility] phone [phone]  Requesting an RX refill or new RX.    Is this a refill or new RX: Refill    RX name and strength (copy/paste from chart):  atorvastatin (LIPITOR) 40 MG tablet    Is this a 30 day or 90 day RX: 90    Pharmacy name and phone # (copy/paste from chart):    [facility] - [facility] [address] [facility]  [address] [facility] [address]  Phone: [phone] Fax: [phone]       The doctors have asked that we provide their patients with the following 2 reminders -- prescription refills can take up to 72 hours, and a friendly reminder that in the future you can use your [facility] account to request refills: N/A

## 2025-01-14 DIAGNOSIS — Z00.00 ENCOUNTER FOR MEDICARE ANNUAL WELLNESS EXAM: ICD-10-CM

## 2025-01-15 ENCOUNTER — TELEPHONE (OUTPATIENT)
Dept: FAMILY MEDICINE | Facility: CLINIC | Age: 70
End: 2025-01-15
Payer: MEDICARE

## 2025-01-15 DIAGNOSIS — R47.81 SLURRED SPEECH: Primary | ICD-10-CM

## 2025-01-15 NOTE — TELEPHONE ENCOUNTER
----- Message from [employer] sent at 1/15/2025 11:53 AM CST -----  Contact: self  .Type:  Patient Returning Call    Who Called:Pricilla Herman  Who Left Message for Patient:Gennaro LYNCH  Does the patient know what this is regarding?:yes  Would the patient rather a call back or a response via [facility]? Call back  Best Call Back Number: [phone]  Additional Information:

## 2025-01-15 NOTE — TELEPHONE ENCOUNTER
----- Message from Radha sent at 1/15/2025 11:17 AM CST -----  Contact: [phone]  Type:  Needs Medical Advice    Who Called: AGUSTÍN BURKETT [[id number]]  Symptoms (please be specific): patient called asking for a referral to see an Neurologist in [place]   How long has patient had these symptoms:    Pharmacy name and phone #:    Would the patient rather a call back or a response via [facility]? Call back  Best Call Back Number: [phone]  Additional Information: [id number]

## 2025-01-15 NOTE — TELEPHONE ENCOUNTER
I have signed for the following orders AND/OR meds.  Please call the patient and ask the patient to schedule the testing AND/OR inform about any medications that were sent. Medications have been sent to pharmacy listed below      Orders Placed This Encounter   Procedures    Ambulatory referral/consult to Neurology     Standing Status:   Future     Standing Expiration Date:   2/15/2026     Referral Priority:   Routine     Referral Type:   Consultation     Referral Reason:   Specialty Services Required     Requested Specialty:   Neurology     Number of Visits Requested:   1              [facility] - [place], [place] - [address]   [address]  [facility] [address]  Phone: [phone] Fax: [phone]

## 2025-01-16 ENCOUNTER — TELEPHONE (OUTPATIENT)
Dept: PRIMARY CARE CLINIC | Facility: CLINIC | Age: 70
End: 2025-01-16
Payer: MEDICARE

## 2025-01-16 DIAGNOSIS — R47.81 SLURRED SPEECH: Primary | ICD-10-CM

## 2025-01-16 NOTE — TELEPHONE ENCOUNTER
Returned patient's call. Patient would like neurology referral sent in to Dr. Mary Kate Adams, fax number [phone]

## 2025-01-16 NOTE — TELEPHONE ENCOUNTER
----- Message from Josette sent at 1/16/2025 12:06 PM CST -----    Name of Caller:.Pricilla G Virgil   Best Call Back Number:.[phone]   Additional Information: Patient stated she was referred to a doctor yesterday but haven't received a call back. She would like a referral to a different doctor. Call the patient back to advise. Denisa. EL

## 2025-01-16 NOTE — TELEPHONE ENCOUNTER
I have signed for the following orders AND/OR meds.  Please call the patient and ask the patient to schedule the testing AND/OR inform about any medications that were sent. Medications have been sent to pharmacy listed below      Orders Placed This Encounter   Procedures    Ambulatory referral/consult to Neurology     Standing Status:   Future     Standing Expiration Date:   2/16/2026     Referral Priority:   Routine     Referral Type:   Consultation     Referral Reason:   Specialty Services Required     Requested Specialty:   Neurology     Number of Visits Requested:   1              [facility] - [place], [place] - [address]   [address]  [facility] [address]  Phone: [phone] Fax: [phone]

## 2025-01-24 ENCOUNTER — PATIENT MESSAGE (OUTPATIENT)
Facility: CLINIC | Age: 70
End: 2025-01-24
Payer: MEDICARE

## 2025-01-29 ENCOUNTER — TELEPHONE (OUTPATIENT)
Dept: HEMATOLOGY/ONCOLOGY | Facility: CLINIC | Age: 70
End: 2025-01-29
Payer: MEDICARE

## 2025-01-29 NOTE — TELEPHONE ENCOUNTER
N/a nurse left detailed vm informing pt of the rescheduled visit. Nurse advised the pt to call back with any questions or concerns.

## 2025-02-10 ENCOUNTER — TELEPHONE (OUTPATIENT)
Dept: HEMATOLOGY/ONCOLOGY | Facility: CLINIC | Age: 70
End: 2025-02-10
Payer: MEDICARE

## 2025-02-10 NOTE — TELEPHONE ENCOUNTER
Nurse spoke with pt in regards to rescheduling her appt. Nurse was informed by pt that she had moved to [place]. Nurse verbalized understanding. Appt canceled